# Patient Record
Sex: FEMALE | Race: WHITE | URBAN - METROPOLITAN AREA
[De-identification: names, ages, dates, MRNs, and addresses within clinical notes are randomized per-mention and may not be internally consistent; named-entity substitution may affect disease eponyms.]

---

## 2023-05-02 ENCOUNTER — OFFICE VISIT (OUTPATIENT)
Dept: ORTHOPEDIC SURGERY | Age: 73
End: 2023-05-02

## 2023-05-02 VITALS — TEMPERATURE: 98 F | WEIGHT: 112 LBS | HEIGHT: 58 IN | BODY MASS INDEX: 23.51 KG/M2

## 2023-05-02 DIAGNOSIS — M17.11 PRIMARY OSTEOARTHRITIS OF RIGHT KNEE: Primary | ICD-10-CM

## 2023-05-02 DIAGNOSIS — M19.079 ARTHRITIS OF ANKLE: ICD-10-CM

## 2023-05-02 DIAGNOSIS — M17.12 PRIMARY OSTEOARTHRITIS OF LEFT KNEE: ICD-10-CM

## 2023-05-02 RX ORDER — HYDROXYCHLOROQUINE SULFATE 200 MG/1
200 TABLET, FILM COATED ORAL DAILY
COMMUNITY
Start: 2023-02-16

## 2023-05-02 RX ORDER — ROSUVASTATIN CALCIUM 10 MG/1
TABLET, COATED ORAL
COMMUNITY
Start: 2021-08-02

## 2023-05-10 RX ORDER — TRIAMCINOLONE ACETONIDE 40 MG/ML
40 INJECTION, SUSPENSION INTRA-ARTICULAR; INTRAMUSCULAR ONCE
Status: COMPLETED | OUTPATIENT
Start: 2023-05-10 | End: 2023-05-10

## 2023-05-10 RX ADMIN — TRIAMCINOLONE ACETONIDE 40 MG: 40 INJECTION, SUSPENSION INTRA-ARTICULAR; INTRAMUSCULAR at 15:21

## 2023-05-22 DIAGNOSIS — M19.079 ARTHRITIS OF ANKLE: Primary | ICD-10-CM

## 2023-05-24 DIAGNOSIS — E78.5 DYSLIPIDEMIA: ICD-10-CM

## 2023-05-24 RX ORDER — HYDROXYCHLOROQUINE SULFATE 200 MG/1
200 TABLET ORAL DAILY
COMMUNITY

## 2023-05-24 RX ORDER — ALBUTEROL SULFATE 90 UG/1
1 AEROSOL, METERED RESPIRATORY (INHALATION) 4 TIMES DAILY PRN
COMMUNITY
Start: 2022-12-27 | End: 2023-10-30 | Stop reason: ALTCHOICE

## 2023-05-24 RX ORDER — BACLOFEN 10 MG/1
10 TABLET ORAL NIGHTLY
COMMUNITY
Start: 2022-11-21 | End: 2023-10-30 | Stop reason: ALTCHOICE

## 2023-05-24 RX ORDER — ASTRAGALUS ROOT 470 MG
2 CAPSULE ORAL DAILY
COMMUNITY
Start: 2022-09-16 | End: 2023-10-30 | Stop reason: ALTCHOICE

## 2023-05-24 RX ORDER — MELOXICAM 7.5 MG/1
1 TABLET ORAL DAILY
COMMUNITY
Start: 2022-11-21 | End: 2023-10-30 | Stop reason: ALTCHOICE

## 2023-05-24 RX ORDER — ROSUVASTATIN CALCIUM 10 MG/1
1 TABLET, COATED ORAL DAILY
COMMUNITY
Start: 2021-08-02 | End: 2023-05-24 | Stop reason: SDUPTHER

## 2023-05-24 RX ORDER — DEXTROMETHORPHAN POLISTIREX 30 MG/5 ML
1 SUSPENSION, EXTENDED RELEASE 12 HR ORAL DAILY
COMMUNITY
Start: 2023-01-24 | End: 2023-10-30 | Stop reason: ALTCHOICE

## 2023-05-26 RX ORDER — ROSUVASTATIN CALCIUM 10 MG/1
10 TABLET, COATED ORAL DAILY
Qty: 90 TABLET | Refills: 0 | Status: SHIPPED | OUTPATIENT
Start: 2023-05-26 | End: 2023-09-20 | Stop reason: SDUPTHER

## 2023-08-10 ENCOUNTER — HOSPITAL ENCOUNTER (OUTPATIENT)
Dept: DATA CONVERSION | Facility: HOSPITAL | Age: 73
End: 2023-08-10
Attending: OTOLARYNGOLOGY | Admitting: OTOLARYNGOLOGY
Payer: COMMERCIAL

## 2023-08-10 DIAGNOSIS — J34.3 HYPERTROPHY OF NASAL TURBINATES: ICD-10-CM

## 2023-08-10 DIAGNOSIS — J34.2 DEVIATED NASAL SEPTUM: ICD-10-CM

## 2023-08-10 DIAGNOSIS — J32.9 CHRONIC SINUSITIS, UNSPECIFIED: ICD-10-CM

## 2023-08-10 DIAGNOSIS — J34.9 UNSPECIFIED DISORDER OF NOSE AND NASAL SINUSES: ICD-10-CM

## 2023-08-10 DIAGNOSIS — J34.89 OTHER SPECIFIED DISORDERS OF NOSE AND NASAL SINUSES: ICD-10-CM

## 2023-09-20 DIAGNOSIS — E78.5 DYSLIPIDEMIA: ICD-10-CM

## 2023-09-20 RX ORDER — ROSUVASTATIN CALCIUM 10 MG/1
10 TABLET, COATED ORAL DAILY
Qty: 90 TABLET | Refills: 1 | Status: SHIPPED | OUTPATIENT
Start: 2023-09-20 | End: 2024-03-11

## 2023-09-20 NOTE — TELEPHONE ENCOUNTER
Fax request pt is seeing you on 10-30, last lipid is from 9-7-22, chol. 180, HDL 54.0, LDL 90, triglycerides  180

## 2023-09-29 VITALS
WEIGHT: 110.23 LBS | TEMPERATURE: 97.2 F | BODY MASS INDEX: 23.14 KG/M2 | HEIGHT: 58 IN | SYSTOLIC BLOOD PRESSURE: 155 MMHG | RESPIRATION RATE: 17 BRPM | HEART RATE: 83 BPM | DIASTOLIC BLOOD PRESSURE: 75 MMHG

## 2023-09-30 NOTE — H&P
History of Present Illness:   History Present Illness:  Reason for surgery: Sinus pressure, recurrent sinusitis,  nasal septal deviation, cesar bullosa, hypertrophy of both inferior nasal turbinates   HPI:    Patient is a 72-year-old female here for septoplasty, RFA, limited sinus surgery, reduction of cesar bullosa.  She was last seen in Dr. Bustos's office on 6/2/2023.   His note is as follows:    06.02.2023: She comes for follow up. Still has right maxillary sinus/cheek pressure off and on. Injection likely did not help her. She has some difficulty with breathing  from her right side of nose. CT scan shows deviated nasal septum to right, left cesar bullosa, enlarged inferior turbinates. She gets nose-sinus infections in winter.    Recommendations:  1- septoplasty, reduction of left cesar bullosa, radiofrequency inferior turbinate reduction, limited sinus surgery 77843, 38855, 09447, 29990, 35627    ____________________________________________________________________________________________    05.15.2023: Her CT sinus does not show a sinus infection. Left cesar bullosa, septum deviated to right. Her most tender area is the middle part of her right cheek. I think this could be a nerve pain. 10 mg dexamethasone was injected with local anesthetics.     Plan:  1- local steroid  2- follow up in 2 weeks  ____________________________________________________________________________________________    Ms. Perea is a 71 yo F. She has frequent infections like 4-5 times a year, for many years, could be for 20 years or more. The infections starts as a sinus infection and then may progress into a lung infection, pneumonia.    She has tried fluticasone spray, for about 1-2 years.  She does not think it made any difference at all.    Her main concern is her right cheek pressure.  No environmental allergies.    On examination, TMs look intact  Nasal septum mildly deviated to right superiorly and to left posteriorly  No  visible postnasal discharge  No polyps or purulent discharge on endoscopic exam    Dx:  1- recurrent sinusitis  2- chronic right maxillary sinus pain - pressure    Plan:  1- CT sinus       Today, she is here by herself.  will be here later to take her home. She has had chronic sinus pressure or years, more right-sided than left. There has been no changes in symptoms since she was last seen. Her pulmonologist has put her back on prednisone  for another taper. Otherwise, denies fever, chills, SOB, cough, CP, n/v/d.    Past medical history: ASCVD, dyslipidemia, high-grade dysplasia colonic adenoma, nonrheumatic aortic valve stenosis, osteopenia, groundglass densities seen on recent CT, septic left knee pos for MRSA  Past surgical history: discectomy, D&C, hysterectomy, left knee debridement bronchoscopy   Social history: Never smoker    Allergies:        Allergies:  ·  Demerol : Unknown    Home Medication Review:   Home Medications Reviewed: yes     Impression/Procedure:   ·  Impression and Planned Procedure: Sinus pressure, recurrent sinusitis, no septal deviation, cesar bullosa, hypertrophy of bilateral inferior nasal turbinates  Septoplasty, radiofrequency turbinate reduction, limited sinus surgery, reduction of cesar bullosa       ERAS (Enhanced Recovery After Surgery):  ·  ERAS Patient: no     Review of Systems:   Review of Systems:  Constitutional: NEGATIVE: Fever, Chills, Anorexia,  Weight Loss, Malaise     Eyes: NEGATIVE: Vision Loss/ Change     ENMT: POSITIVE: Nasal Discharge, Nasal Congestion ; NEGATIVE: Ear Pain, Mouth Pain, Throat Pain     Respiratory: NEGATIVE: Dry Cough, Productive Cough,  Wheezing, Shortness of Breath     Cardiac: NEGATIVE: Chest Pain     Gastrointestinal: NEGATIVE: Nausea, Vomiting, Diarrhea     Musculoskeletal: NEGATIVE: Weakness     Neurological: NEGATIVE: Headache     Skin: NEGATIVE: Rash     Hematologic/Lymph: NEGATIVE: Easy Bleeding     All Other Systems: All other systems  reviewed and  are negative       Vital Signs:  Temperature C: 36.2 degrees C   Temperature F: 97.1 degrees F   Heart Rate: 83 beats per minute   Respiratory Rate: 17 breath per minute   Blood Pressure Systolic: 155 mm/Hg   Blood Pressure Diastolic: 75 mm/Hg     Physical Exam by System:    Constitutional: Well developed, A&O x3, in no apparent  distress, alert and cooperative   Eyes: PERRL, EOMI, nonicteric   ENMT: Mucous membranes moist, no apparent injury   Head/Neck: Neck supple, no apparent injury   Respiratory/Thorax: Patent airways, CTAB, normal  breath sounds with good chest expansion, thorax symmetric   Cardiovascular: Regular, normal S 1and S 2, no murmurs,  2+ equal pulses of the extremities   Neurological: CN II-XII grossly intact   Psychological: Appropriate mood and behavior   Skin: Warm and dry, no lesions, no rashes     Consent:   COVID-19 Consent:  ·  COVID-19 Risk Consent Surgeon has reviewed key risks related to the risk of john COVID-19 and if they contract COVID-19 what the risks are.     Attestation:   Note Completion:  I am a:  Advanced Practice Provider   Attending Only - Shared Visit with Advanced Practice Provider This is a shared visit.  I have reviewed the Advanced Practice Provider?s encounter note, approve the Advanced Practice Provider?s documentation,  and provide the following additional information from my personal encounter.    Comments/ Additional Findings    none          Electronic Signatures:  Joan Cole (PAC)  (Signed 10-Aug-2023 09:55)   Authored: History of Present Illness, Allergies, Home  Medication Review, Impression/Procedure, ERAS, Review of Systems, Physical Exam, Consent, Note Completion  Yunior Bustos)  (Signed 10-Aug-2023 09:57)   Authored: Note Completion   Co-Signer: History of Present Illness, Allergies, Home Medication Review, Impression/Procedure, ERAS, Review of Systems, Physical Exam,  Consent, Note Completion      Last Updated: 10-Aug-2023  09:57 by Yunior Bustos)

## 2023-10-01 NOTE — OP NOTE
Post Operative Note:     PreOp Diagnosis: 1- Sinus pressure, 2- recurrent  sinusitis, 3- nose septum deviation, 4- cesar bullosa, 5- hypertrophy of bilateral inferior nasal turbinates   Post-Procedure Diagnosis: 1- Sinus pressure,  2- recurrent sinusitis,   3- nose septum deviation,   4- cesar bullosa,   5- hypertrophy of bilateral inferior nasal turbinates   Procedure: 1.  Septoplasty 98191  2.  Radiofrequency inferior turbinate reduction 10264  3.  Limited sinus surgery 73583, 21906  4.  Reduction of left cesar bullosa 05329   Surgeon: Dr. Bustos   Resident/Fellow/Other Assistant: None   Anesthesia: General   I.V. Fluids: LR   Estimated Blood Loss (mL): 5   Specimen: yes   Complications: None   Findings: Nasal septum deviated to right, left cesar  bullosa, congested inferior turbinates   Patient Returned To/Condition: PACU/stable     Attestation:   Note Completion:  Attending Attestation I performed the procedure without a resident    Comments/ Additional Findings    HISTORY OF PRESENT ILLNESS:  06.02.2023: She comes for follow up. Still has right maxillary sinus/cheek pressure off and on. Injection likely did not help her. She has some difficulty with breathing from her right side of nose. CT scan shows deviated nasal septum to right, left cesar  bullosa, enlarged inferior turbinates. She gets nose-sinus infections in winter.    Recommendations:  1- septoplasty, reduction of left cesar bullosa, radiofrequency inferior turbinate reduction, limited sinus surgery 34954, 50047, 79757, 88380, 40023    _____________________________________________________________________________________    05.15.2023: Her CT sinus does not show a sinus infection. Left cesar bullosa, septum deviated to right. Her most tender area is the middle part of her right cheek. I think this could be a nerve pain. 10 mg dexamethasone was injected with local anesthetics.     Plan:  1- local steroid  2- follow up in 2  weeks  _____________________________________________________________________________________    Ms. Perea is a 71 yo F. She has frequent infections like 4-5 times a year, for many years, could be for 20 years or more. The infections starts as a sinus infection and then may progress into a lung infection, pneumonia.    She has tried fluticasone spray, for about 1-2 years.  She does not think it made any difference at all.    Her main concern is her right cheek pressure.  No environmental allergies.    On examination, TMs look intact  Nasal septum mildly deviated to right superiorly and to left posteriorly  No visible postnasal discharge  No polyps or purulent discharge on endoscopic exam    Dx:  1- recurrent sinusitis  2- chronic right maxillary sinus pain - pressure    Plan:  1-   CT sinus    DESCRIPTION OF THE PROCEDURE:  The patient was brought to the operative suite, placed supine on the operative table.  Time-out was called. Anesthesia was administered by Anesthesia Department.   Please refer to their records for details.  The patient was draped and prepped for the operation.   After topical decongestion, 0 degree endoscope was advanced through patient's nasal cavity. Septum was deviated to right. Septal mucosa was infiltrated with lidocaine, bupivacaine and epinephrine.  A right modified [place] incision was done.  Mucoperichondrial  flaps were raised on both sides.  Karely knife was used to take out a quadrangular  portion of the cartilaginous septum anteriorly. Pituitary forceps, trucut forceps and turbinate scissors were used to remove the deviated portions of cartilaginous  and bony septum. L strut was left to support the dorsum and the tip of the nose.  0-degree endoscope was advanced through the patient's left nasal cavity.  Head and body of the left middle turbinate was injected with lidocaine, ropivacaine and epinephrine. Left middle turbinate was cesar bullosa. A vertical incision using no 15  blade  was done to it, starting from the head. Lateral part of the turbinate was resected using scissors and trucut device. Backbiter forceps was advanced.  Left uncinate process was cut anteriorly using back-biter forceps.  Then, microdebrider with 4 mm blade  was used to remove the left uncinate process. Left maxillary sinus ostium was identified. Left maxillary antrostomy was widened. Left ethmoid bulla was opened. Partial anterior ethmoidectomy was done. Left middle turbinate was lateralized.   0-degree endoscope was advanced through the patient's right nasal cavity.  Head and body of the right middle turbinate was injected with lidocaine, bupivacaine and epinephrine. Right middle turbinate was reduced using radiofrequency. Backbiter forceps  was advanced.  Right uncinate process was cut anteriorly using back-biter forceps.  Then, microdebrider with 4 mm blade was used to remove the right uncinate process. Right maxillary sinus ostium was identified and widened. Right ethmoid bulla was opened.  Partial right anterior ethmoidectomy was done.   Radiofrequency was applied to inferior turbinates anteriorly, posteriorly and at middle parts (max 20 nagy), to shrink the turbinates.   Hemopore was placed to middle meatus and to medial parts of middle turbinates on both sides.  5.0 fast absorbing gut was used to close septoplasty incision. Septum stapler was used to suture the septal mucosal flaps. Merocel with bacitracin were placed into both nasal cavities. Procedure was concluded.    DISPOSITION:  Discharge home.    FOLLOW UP:  In 4 weeks in ENT clinic.    MEDICATIONS:  1- oral antibiotic tab,  2- Tylenol with codeine for pain  3- Nasal rinse  4- mupirocin ointment          Electronic Signatures:  Joan Cole (PAC)  (Signed 10-Aug-2023 10:12)   Authored: Post Operative Note, Note Completion  Yuniro Bustos)  (Signed 10-Aug-2023 12:13)   Authored: Post Operative Note, Note Completion      Last Updated:  10-Aug-2023 12:13 by Yunior Bustos)

## 2023-10-13 PROBLEM — J32.9 RECURRENT SINUSITIS: Status: ACTIVE | Noted: 2023-10-13

## 2023-10-13 PROBLEM — I35.0 NONRHEUMATIC AORTIC VALVE STENOSIS: Status: ACTIVE | Noted: 2023-10-13

## 2023-10-13 PROBLEM — J01.00 ACUTE MAXILLARY SINUSITIS: Status: ACTIVE | Noted: 2023-10-13

## 2023-10-13 PROBLEM — R91.1 LUNG NODULE: Status: ACTIVE | Noted: 2023-10-13

## 2023-10-13 PROBLEM — J20.9 ACUTE BRONCHITIS: Status: ACTIVE | Noted: 2023-10-13

## 2023-10-13 PROBLEM — R93.89 ABNORMAL CXR: Status: ACTIVE | Noted: 2023-10-13

## 2023-10-13 PROBLEM — D51.0 PERNICIOUS ANEMIA: Status: ACTIVE | Noted: 2023-10-13

## 2023-10-13 PROBLEM — J34.89 NASAL CRUSTING: Status: ACTIVE | Noted: 2023-10-13

## 2023-10-13 PROBLEM — J84.9 INTERSTITIAL LUNG DISEASE (MULTI): Status: ACTIVE | Noted: 2023-10-13

## 2023-10-13 PROBLEM — R53.82 CHRONIC FATIGUE: Status: ACTIVE | Noted: 2023-10-13

## 2023-10-13 PROBLEM — D12.6 ADENOMATOUS POLYP OF COLON: Status: ACTIVE | Noted: 2023-10-13

## 2023-10-13 PROBLEM — E78.5 DYSLIPIDEMIA: Status: ACTIVE | Noted: 2023-10-13

## 2023-10-13 PROBLEM — J18.9 PNEUMONIA: Status: ACTIVE | Noted: 2023-10-13

## 2023-10-13 PROBLEM — M54.2 NECK PAIN: Status: ACTIVE | Noted: 2023-10-13

## 2023-10-13 PROBLEM — R19.5 POSITIVE COLORECTAL CANCER SCREENING USING COLOGUARD TEST: Status: ACTIVE | Noted: 2023-10-13

## 2023-10-13 PROBLEM — R03.0 ELEVATED BP WITHOUT DIAGNOSIS OF HYPERTENSION: Status: ACTIVE | Noted: 2023-10-13

## 2023-10-13 PROBLEM — R93.89 ABNORMAL CT SCAN: Status: ACTIVE | Noted: 2023-10-13

## 2023-10-13 PROBLEM — R51.9 FACIAL PAIN: Status: ACTIVE | Noted: 2023-10-13

## 2023-10-13 PROBLEM — K63.5 COLON POLYP: Status: ACTIVE | Noted: 2023-10-13

## 2023-10-13 PROBLEM — M25.551 HIP PAIN, RIGHT: Status: ACTIVE | Noted: 2023-10-13

## 2023-10-13 PROBLEM — N81.10 BLADDER PROLAPSE, FEMALE, ACQUIRED: Status: ACTIVE | Noted: 2023-10-13

## 2023-10-13 PROBLEM — R91.8 GROUND GLASS OPACITY PRESENT ON IMAGING OF LUNG: Status: ACTIVE | Noted: 2023-10-13

## 2023-10-13 PROBLEM — E55.9 VITAMIN D DEFICIENCY: Status: ACTIVE | Noted: 2023-10-13

## 2023-10-13 PROBLEM — M85.89 OSTEOPENIA OF MULTIPLE SITES: Status: ACTIVE | Noted: 2023-10-13

## 2023-10-13 PROBLEM — M54.9 UPPER BACK PAIN: Status: ACTIVE | Noted: 2023-10-13

## 2023-10-13 PROBLEM — J34.2 NOSE SEPTUM DEVIATION: Status: ACTIVE | Noted: 2023-10-13

## 2023-10-13 PROBLEM — J34.3 HYPERTROPHY OF BOTH INFERIOR NASAL TURBINATES: Status: ACTIVE | Noted: 2023-10-13

## 2023-10-13 PROBLEM — J34.89 CONCHA BULLOSA: Status: ACTIVE | Noted: 2023-10-13

## 2023-10-13 PROBLEM — D12.8 TUBULOVILLOUS ADENOMA OF RECTUM: Status: ACTIVE | Noted: 2023-10-13

## 2023-10-13 PROBLEM — R06.02 SOBOE (SHORTNESS OF BREATH ON EXERTION): Status: ACTIVE | Noted: 2023-10-13

## 2023-10-13 PROBLEM — J34.89 OTHER SPECIFIED DISORDERS OF NOSE AND NASAL SINUSES: Status: ACTIVE | Noted: 2023-10-13

## 2023-10-13 PROBLEM — I25.10 ASCVD (ARTERIOSCLEROTIC CARDIOVASCULAR DISEASE): Status: ACTIVE | Noted: 2023-10-13

## 2023-10-13 RX ORDER — BETAMETHASONE DIPROPIONATE 0.5 MG/G
CREAM TOPICAL
COMMUNITY
Start: 2023-05-15 | End: 2023-10-30 | Stop reason: ALTCHOICE

## 2023-10-30 ENCOUNTER — ANCILLARY PROCEDURE (OUTPATIENT)
Dept: RADIOLOGY | Facility: CLINIC | Age: 73
End: 2023-10-30
Payer: MEDICARE

## 2023-10-30 ENCOUNTER — OFFICE VISIT (OUTPATIENT)
Dept: PRIMARY CARE | Facility: CLINIC | Age: 73
End: 2023-10-30
Payer: MEDICARE

## 2023-10-30 ENCOUNTER — LAB (OUTPATIENT)
Dept: LAB | Facility: LAB | Age: 73
End: 2023-10-30
Payer: MEDICARE

## 2023-10-30 VITALS
BODY MASS INDEX: 23.72 KG/M2 | SYSTOLIC BLOOD PRESSURE: 174 MMHG | DIASTOLIC BLOOD PRESSURE: 91 MMHG | OXYGEN SATURATION: 95 % | HEIGHT: 58 IN | HEART RATE: 78 BPM | WEIGHT: 113 LBS

## 2023-10-30 DIAGNOSIS — E55.9 VITAMIN D DEFICIENCY: ICD-10-CM

## 2023-10-30 DIAGNOSIS — Z00.00 ROUTINE GENERAL MEDICAL EXAMINATION AT HEALTH CARE FACILITY: ICD-10-CM

## 2023-10-30 DIAGNOSIS — D51.0 PERNICIOUS ANEMIA: ICD-10-CM

## 2023-10-30 DIAGNOSIS — N81.10 BLADDER PROLAPSE, FEMALE, ACQUIRED: Primary | ICD-10-CM

## 2023-10-30 DIAGNOSIS — Z13.0 SCREENING FOR DEFICIENCY ANEMIA: ICD-10-CM

## 2023-10-30 DIAGNOSIS — E78.5 DYSLIPIDEMIA: ICD-10-CM

## 2023-10-30 DIAGNOSIS — J84.9 INTERSTITIAL LUNG DISEASE (MULTI): ICD-10-CM

## 2023-10-30 DIAGNOSIS — Z13.21 ENCOUNTER FOR VITAMIN DEFICIENCY SCREENING: ICD-10-CM

## 2023-10-30 DIAGNOSIS — Z78.0 MENOPAUSE: ICD-10-CM

## 2023-10-30 DIAGNOSIS — Z78.0 ASYMPTOMATIC MENOPAUSAL STATE: ICD-10-CM

## 2023-10-30 DIAGNOSIS — R79.9 ABNORMAL FINDING OF BLOOD CHEMISTRY, UNSPECIFIED: ICD-10-CM

## 2023-10-30 DIAGNOSIS — Z13.89 SCREENING FOR BLOOD OR PROTEIN IN URINE: ICD-10-CM

## 2023-10-30 DIAGNOSIS — Z00.00 ROUTINE GENERAL MEDICAL EXAMINATION AT HEALTH CARE FACILITY: Primary | ICD-10-CM

## 2023-10-30 DIAGNOSIS — M13.0 POLYARTHRITIS, UNSPECIFIED: ICD-10-CM

## 2023-10-30 DIAGNOSIS — N81.10 BLADDER PROLAPSE, FEMALE, ACQUIRED: ICD-10-CM

## 2023-10-30 DIAGNOSIS — Z13.1 SCREENING FOR DIABETES MELLITUS (DM): ICD-10-CM

## 2023-10-30 DIAGNOSIS — R03.0 ELEVATED BP WITHOUT DIAGNOSIS OF HYPERTENSION: ICD-10-CM

## 2023-10-30 DIAGNOSIS — R53.82 CHRONIC FATIGUE: ICD-10-CM

## 2023-10-30 DIAGNOSIS — I10 HYPERTENSION, UNSPECIFIED TYPE: ICD-10-CM

## 2023-10-30 DIAGNOSIS — Z12.31 ENCOUNTER FOR SCREENING MAMMOGRAM FOR MALIGNANT NEOPLASM OF BREAST: ICD-10-CM

## 2023-10-30 LAB
ALBUMIN SERPL BCP-MCNC: 4.5 G/DL (ref 3.4–5)
ALP SERPL-CCNC: 70 U/L (ref 33–136)
ALT SERPL W P-5'-P-CCNC: 16 U/L (ref 7–45)
ANION GAP SERPL CALC-SCNC: 16 MMOL/L (ref 10–20)
AST SERPL W P-5'-P-CCNC: 21 U/L (ref 9–39)
BASOPHILS # BLD AUTO: 0.03 X10*3/UL (ref 0–0.1)
BASOPHILS NFR BLD AUTO: 0.4 %
BILIRUB SERPL-MCNC: 0.6 MG/DL (ref 0–1.2)
BUN SERPL-MCNC: 8 MG/DL (ref 6–23)
CALCIUM SERPL-MCNC: 9.2 MG/DL (ref 8.6–10.3)
CHLORIDE SERPL-SCNC: 105 MMOL/L (ref 98–107)
CO2 SERPL-SCNC: 25 MMOL/L (ref 21–32)
CREAT SERPL-MCNC: 0.58 MG/DL (ref 0.5–1.05)
EOSINOPHIL # BLD AUTO: 0.27 X10*3/UL (ref 0–0.4)
EOSINOPHIL NFR BLD AUTO: 3.9 %
ERYTHROCYTE [DISTWIDTH] IN BLOOD BY AUTOMATED COUNT: 13.4 % (ref 11.5–14.5)
FERRITIN SERPL-MCNC: 208 NG/ML (ref 8–150)
GFR SERPL CREATININE-BSD FRML MDRD: >90 ML/MIN/1.73M*2
GLUCOSE SERPL-MCNC: 79 MG/DL (ref 74–99)
HCT VFR BLD AUTO: 42.8 % (ref 36–46)
HGB BLD-MCNC: 13.3 G/DL (ref 12–16)
IMM GRANULOCYTES # BLD AUTO: 0.01 X10*3/UL (ref 0–0.5)
IMM GRANULOCYTES NFR BLD AUTO: 0.1 % (ref 0–0.9)
IRON SATN MFR SERPL: 25 % (ref 25–45)
IRON SERPL-MCNC: 89 UG/DL (ref 35–150)
LYMPHOCYTES # BLD AUTO: 1.52 X10*3/UL (ref 0.8–3)
LYMPHOCYTES NFR BLD AUTO: 21.7 %
MAGNESIUM SERPL-MCNC: 2.4 MG/DL (ref 1.6–2.4)
MCH RBC QN AUTO: 28.5 PG (ref 26–34)
MCHC RBC AUTO-ENTMCNC: 31.1 G/DL (ref 32–36)
MCV RBC AUTO: 92 FL (ref 80–100)
MONOCYTES # BLD AUTO: 0.62 X10*3/UL (ref 0.05–0.8)
MONOCYTES NFR BLD AUTO: 8.9 %
NEUTROPHILS # BLD AUTO: 4.54 X10*3/UL (ref 1.6–5.5)
NEUTROPHILS NFR BLD AUTO: 65 %
NRBC BLD-RTO: 0 /100 WBCS (ref 0–0)
PLATELET # BLD AUTO: 286 X10*3/UL (ref 150–450)
PMV BLD AUTO: 9.5 FL (ref 7.5–11.5)
POC APPEARANCE, URINE: CLEAR
POC BILIRUBIN, URINE: NEGATIVE
POC BLOOD, URINE: NEGATIVE
POC COLOR, URINE: YELLOW
POC GLUCOSE, URINE: NEGATIVE MG/DL
POC KETONES, URINE: NEGATIVE MG/DL
POC LEUKOCYTES, URINE: NEGATIVE
POC NITRITE,URINE: NEGATIVE
POC PH, URINE: 5.5 PH
POC PROTEIN, URINE: NEGATIVE MG/DL
POC SPECIFIC GRAVITY, URINE: <=1.005
POC UROBILINOGEN, URINE: 0.2 EU/DL
POTASSIUM SERPL-SCNC: 3.8 MMOL/L (ref 3.5–5.3)
PROT SERPL-MCNC: 7.5 G/DL (ref 6.4–8.2)
RBC # BLD AUTO: 4.66 X10*6/UL (ref 4–5.2)
SODIUM SERPL-SCNC: 142 MMOL/L (ref 136–145)
TIBC SERPL-MCNC: 350 UG/DL (ref 240–445)
UIBC SERPL-MCNC: 261 UG/DL (ref 110–370)
WBC # BLD AUTO: 7 X10*3/UL (ref 4.4–11.3)

## 2023-10-30 PROCEDURE — 1170F FXNL STATUS ASSESSED: CPT

## 2023-10-30 PROCEDURE — 82607 VITAMIN B-12: CPT

## 2023-10-30 PROCEDURE — 1159F MED LIST DOCD IN RCRD: CPT

## 2023-10-30 PROCEDURE — 1160F RVW MEDS BY RX/DR IN RCRD: CPT

## 2023-10-30 PROCEDURE — 83550 IRON BINDING TEST: CPT

## 2023-10-30 PROCEDURE — 1036F TOBACCO NON-USER: CPT

## 2023-10-30 PROCEDURE — 81003 URINALYSIS AUTO W/O SCOPE: CPT

## 2023-10-30 PROCEDURE — 82746 ASSAY OF FOLIC ACID SERUM: CPT

## 2023-10-30 PROCEDURE — 74177 CT ABD & PELVIS W/CONTRAST: CPT | Mod: MF

## 2023-10-30 PROCEDURE — 82306 VITAMIN D 25 HYDROXY: CPT

## 2023-10-30 PROCEDURE — 83036 HEMOGLOBIN GLYCOSYLATED A1C: CPT

## 2023-10-30 PROCEDURE — 74177 CT ABD & PELVIS W/CONTRAST: CPT

## 2023-10-30 PROCEDURE — 2550000001 HC RX 255 CONTRASTS

## 2023-10-30 PROCEDURE — 80053 COMPREHEN METABOLIC PANEL: CPT

## 2023-10-30 PROCEDURE — 36415 COLL VENOUS BLD VENIPUNCTURE: CPT

## 2023-10-30 PROCEDURE — 3080F DIAST BP >= 90 MM HG: CPT

## 2023-10-30 PROCEDURE — 82728 ASSAY OF FERRITIN: CPT

## 2023-10-30 PROCEDURE — 99214 OFFICE O/P EST MOD 30 MIN: CPT

## 2023-10-30 PROCEDURE — 3077F SYST BP >= 140 MM HG: CPT

## 2023-10-30 PROCEDURE — G0438 PPPS, INITIAL VISIT: HCPCS

## 2023-10-30 PROCEDURE — 83735 ASSAY OF MAGNESIUM: CPT

## 2023-10-30 PROCEDURE — 83540 ASSAY OF IRON: CPT

## 2023-10-30 PROCEDURE — 85025 COMPLETE CBC W/AUTO DIFF WBC: CPT

## 2023-10-30 RX ORDER — LEFLUNOMIDE 10 MG/1
TABLET ORAL
COMMUNITY
Start: 2023-10-05

## 2023-10-30 RX ORDER — PREDNISONE 5 MG/1
2.5 TABLET ORAL DAILY
COMMUNITY
Start: 2023-09-19 | End: 2024-03-04 | Stop reason: SDUPTHER

## 2023-10-30 RX ORDER — ESTRADIOL 0.1 MG/G
CREAM VAGINAL
COMMUNITY
Start: 2023-10-27 | End: 2023-12-05 | Stop reason: ALTCHOICE

## 2023-10-30 RX ADMIN — IOHEXOL 75 ML: 350 INJECTION, SOLUTION INTRAVENOUS at 11:05

## 2023-10-30 ASSESSMENT — ENCOUNTER SYMPTOMS
RESPIRATORY NEGATIVE: 1
ARTHRALGIAS: 1
ALLERGIC/IMMUNOLOGIC NEGATIVE: 1
GASTROINTESTINAL NEGATIVE: 1
CONSTITUTIONAL NEGATIVE: 1
CARDIOVASCULAR NEGATIVE: 1
NEUROLOGICAL NEGATIVE: 1
HEMATOLOGIC/LYMPHATIC NEGATIVE: 1
NERVOUS/ANXIOUS: 1

## 2023-10-30 ASSESSMENT — ACTIVITIES OF DAILY LIVING (ADL)
GROCERY_SHOPPING: INDEPENDENT
TAKING_MEDICATION: INDEPENDENT
MANAGING_FINANCES: INDEPENDENT
DOING_HOUSEWORK: INDEPENDENT
DRESSING: INDEPENDENT
BATHING: INDEPENDENT

## 2023-10-30 ASSESSMENT — PATIENT HEALTH QUESTIONNAIRE - PHQ9
1. LITTLE INTEREST OR PLEASURE IN DOING THINGS: NOT AT ALL
2. FEELING DOWN, DEPRESSED OR HOPELESS: NOT AT ALL
SUM OF ALL RESPONSES TO PHQ9 QUESTIONS 1 AND 2: 0

## 2023-10-30 NOTE — PROGRESS NOTES
"Subjective   Reason for Visit: Aileen Perea is an 72 y.o. female here for a Medicare Wellness visit.     Past Medical, Surgical, and Family History reviewed and updated in chart.    Reviewed all medications by prescribing practitioner or clinical pharmacist (such as prescriptions, OTCs, herbal therapies and supplements) and documented in the medical record.    Naz comes here today to establish care with this provider and her initial Medicare wellness exam.  She is a retired RN, has complaints of a bladder prolapse and bladder shifting to the left.  She has seen many providers but feels she needs more evaluation.  She has undergone pelvic therapy, used suppositories (Valium and Lidocaine), pessary was discussed but she does not want to use this kind of device if possible. She complains of left groin pain.        Patient Care Team:  Arie Damon MD as PCP - General  [facility] Dam[facility]     Review of Systems   Constitutional: Negative.    HENT: Negative.     Respiratory: Negative.     Cardiovascular: Negative.    Gastrointestinal: Negative.    Genitourinary:  Positive for pelvic pain.   Musculoskeletal:  Positive for arthralgias.   Skin: Negative.    Allergic/Immunologic: Negative.    Neurological: Negative.    Hematological: Negative.    Psychiatric/Behavioral:  The patient is nervous/anxious.        Objective   Vitals:  BP (!) 173/96 (BP Location: Left arm)   Pulse 78   Ht 1.473 m (4' 10\")   Wt 51.3 kg (113 lb)   SpO2 95%   BMI 23.62 kg/m²       Physical Exam  Constitutional:       Appearance: Normal appearance. She is normal weight.   HENT:      Head: Normocephalic.      Nose: Nose normal.      Mouth/Throat:      Mouth: Mucous membranes are moist.      Pharynx: Oropharynx is clear.   Eyes:      Extraocular Movements: Extraocular movements intact.      Conjunctiva/sclera: Conjunctivae normal.      Pupils: Pupils are equal, round, and reactive to light. "   Cardiovascular:      Rate and Rhythm: Normal rate and regular rhythm.      Pulses: Normal pulses.      Heart sounds: Normal heart sounds.   Pulmonary:      Effort: Pulmonary effort is normal.      Breath sounds: Normal breath sounds.   Abdominal:      General: Abdomen is flat. Bowel sounds are normal.      Palpations: Abdomen is soft.   Musculoskeletal:         General: Normal range of motion.      Cervical back: Normal range of motion and neck supple.   Skin:     General: Skin is warm and dry.      Capillary Refill: Capillary refill takes less than 2 seconds.   Neurological:      General: No focal deficit present.      Mental Status: She is alert. Mental status is at baseline.   Psychiatric:         Mood and Affect: Mood normal.         Behavior: Behavior normal.         Thought Content: Thought content normal.         Judgment: Judgment normal.         Assessment/Plan   Problem List Items Addressed This Visit       Bladder prolapse, female, acquired    Relevant Orders    CT abdomen pelvis w and wo IV contrast    Chronic fatigue    Relevant Orders    Iron and TIBC    CBC and Auto Differential    Comprehensive Metabolic Panel    Magnesium    Folate    Ferritin    Dyslipidemia    Elevated BP without diagnosis of hypertension    Relevant Orders    Iron and TIBC    CBC and Auto Differential    Comprehensive Metabolic Panel    Interstitial lung disease (CMS/HCC)    Pernicious anemia    Relevant Orders    Vitamin B12    Vitamin D deficiency    Relevant Orders    Vitamin D 25-Hydroxy,Total (for eval of Vitamin D levels)     Other Visit Diagnoses       Routine general medical examination at health care facility    -  Primary    Relevant Orders    Iron and TIBC    CBC and Auto Differential    Comprehensive Metabolic Panel    BI mammo bilateral screening tomosynthesis    Vitamin D 25-Hydroxy,Total (for eval of Vitamin D levels)    Vitamin B12    Magnesium    Folate    Ferritin    Hemoglobin A1C    POCT UA Automated  manually resulted (Completed)    Screening for diabetes mellitus (DM)        Relevant Orders    Comprehensive Metabolic Panel    Hemoglobin A1C    Encounter for vitamin deficiency screening        Relevant Orders    Vitamin D 25-Hydroxy,Total (for eval of Vitamin D levels)    Screening for deficiency anemia        Relevant Orders    Iron and TIBC    CBC and Auto Differential    Magnesium    Screening for blood or protein in urine        Relevant Orders    POCT UA Automated manually resulted (Completed)    Menopause        Relevant Orders    XR DEXA bone density    Asymptomatic menopausal state        Relevant Orders    XR DEXA bone density    Hypertension, unspecified type        Relevant Orders    CBC and Auto Differential    Comprehensive Metabolic Panel    Encounter for screening mammogram for malignant neoplasm of breast        Relevant Orders    BI mammo bilateral screening tomosynthesis    Polyarthritis, unspecified        Relevant Orders    Iron and TIBC    Abnormal finding of blood chemistry, unspecified        Relevant Orders    Ferritin    Hemoglobin A1C        It was great to see you today!  Please continue taking your prescribed medications.   I have ordered some labs to be done as soon as you can.  We will call you with the results.  I have ordered a bone density test to be done when you can.  We will call the results.  I have ordered you a mammogram to be done as soon as you are able.  We will call the results.      Please follow up in 3 months

## 2023-10-30 NOTE — PATIENT INSTRUCTIONS
t was great to see you today!  Please continue taking your prescribed medications.   I have ordered some labs to be done as soon as you can.  We will call you with the results.  I have ordered a bone density test to be done when you can.  We will call the results.  I have ordered you a mammogram to be done as soon as you are able.  We will call the results.    Please follow up in 3 months

## 2023-10-31 LAB
25(OH)D3 SERPL-MCNC: 41 NG/ML (ref 30–100)
EST. AVERAGE GLUCOSE BLD GHB EST-MCNC: 108 MG/DL
FOLATE SERPL-MCNC: 23.4 NG/ML
HBA1C MFR BLD: 5.4 %
VIT B12 SERPL-MCNC: 349 PG/ML (ref 211–911)

## 2023-11-01 ENCOUNTER — DOCUMENTATION (OUTPATIENT)
Dept: PRIMARY CARE | Facility: CLINIC | Age: 73
End: 2023-11-01
Payer: COMMERCIAL

## 2023-11-01 DIAGNOSIS — E78.5 DYSLIPIDEMIA: Primary | ICD-10-CM

## 2023-11-01 NOTE — PROGRESS NOTES
Called her to notify her of her CT results.  Encouraged her to continue doing her PT exercising for her pelvic floor, and follow up with GYN.

## 2023-11-08 ENCOUNTER — HOSPITAL ENCOUNTER (OUTPATIENT)
Dept: RADIOLOGY | Facility: HOSPITAL | Age: 73
Discharge: HOME | End: 2023-11-08
Payer: MEDICARE

## 2023-11-08 ENCOUNTER — LAB (OUTPATIENT)
Dept: LAB | Facility: LAB | Age: 73
End: 2023-11-08
Payer: MEDICARE

## 2023-11-08 DIAGNOSIS — Z78.0 ASYMPTOMATIC MENOPAUSAL STATE: ICD-10-CM

## 2023-11-08 DIAGNOSIS — E78.5 DYSLIPIDEMIA: ICD-10-CM

## 2023-11-08 DIAGNOSIS — Z78.0 MENOPAUSE: ICD-10-CM

## 2023-11-08 DIAGNOSIS — Z12.31 ENCOUNTER FOR SCREENING MAMMOGRAM FOR MALIGNANT NEOPLASM OF BREAST: ICD-10-CM

## 2023-11-08 DIAGNOSIS — Z00.00 ROUTINE GENERAL MEDICAL EXAMINATION AT HEALTH CARE FACILITY: ICD-10-CM

## 2023-11-08 LAB
CHOLEST SERPL-MCNC: 154 MG/DL (ref 0–199)
CHOLESTEROL/HDL RATIO: 2.5
HDLC SERPL-MCNC: 60.4 MG/DL
LDLC SERPL CALC-MCNC: 65 MG/DL
NON HDL CHOLESTEROL: 94 MG/DL (ref 0–149)
TRIGL SERPL-MCNC: 145 MG/DL (ref 0–149)
VLDL: 29 MG/DL (ref 0–40)

## 2023-11-08 PROCEDURE — 80061 LIPID PANEL: CPT

## 2023-11-08 PROCEDURE — 77067 SCR MAMMO BI INCL CAD: CPT

## 2023-11-08 PROCEDURE — 36415 COLL VENOUS BLD VENIPUNCTURE: CPT

## 2023-11-08 PROCEDURE — 77085 DXA BONE DENSITY AXL VRT FX: CPT | Performed by: RADIOLOGY

## 2023-11-08 PROCEDURE — 77085 DXA BONE DENSITY AXL VRT FX: CPT

## 2023-11-08 PROCEDURE — 77063 BREAST TOMOSYNTHESIS BI: CPT | Performed by: RADIOLOGY

## 2023-11-08 PROCEDURE — 77067 SCR MAMMO BI INCL CAD: CPT | Performed by: RADIOLOGY

## 2023-11-13 ENCOUNTER — OFFICE VISIT (OUTPATIENT)
Dept: OTOLARYNGOLOGY | Facility: CLINIC | Age: 73
End: 2023-11-13
Payer: MEDICARE

## 2023-11-13 DIAGNOSIS — Z09 POSTOPERATIVE EXAMINATION: Primary | ICD-10-CM

## 2023-11-13 PROCEDURE — 1036F TOBACCO NON-USER: CPT | Performed by: OTOLARYNGOLOGY

## 2023-11-13 PROCEDURE — 99024 POSTOP FOLLOW-UP VISIT: CPT | Performed by: OTOLARYNGOLOGY

## 2023-11-13 PROCEDURE — 1160F RVW MEDS BY RX/DR IN RCRD: CPT | Performed by: OTOLARYNGOLOGY

## 2023-11-13 PROCEDURE — 1159F MED LIST DOCD IN RCRD: CPT | Performed by: OTOLARYNGOLOGY

## 2023-11-13 NOTE — PROGRESS NOTES
"  Chief Complaint     follow up \"Frequent Sinus Infection\"  right cheek pain      Adult Risk ScreeningAdult Risk Screening_[facility]: There are no spiritual/cultural practices/values/needs that are important to know   Initial Fall Risk Screening: Her fall did not result in injury. RYDER does not have a fear of falling. She does not need assistance with sitting, standing or walking. Does not need assistance walking in her home. She does not need assistance in an unfamiliar setting. The patient is not using an assistive device.        Domestic Violence Screen: Does not feel threatened or abused physically, emotionally or sexually. Do you feel UNSAFE?   The patient feels safe in the home.   Depression/Suicide Screening:   During the past 2 weeks, the patient has not felt down, depressed or hopeless.    During the past 2 weeks, the patient has not felt little interest or pleasure in doing things.        History of Present Illness    11.13.2023: She feels good.  No sinus pain.  On examination nasal passages look open bilaterally. Middle meatus look open bilaterally.  There was mild crusting over right middle turbinate anteriorly.  Cleaning was done.    Recommendations:  1-follow-up as needed  2-continue nasal rinse off  _________________________________________________________________    09.11.2023: She had surgery on 08.10.2023     Diagnosis:   1- Sinus pressure,   2- recurrent sinusitis,   3- nose septum deviation,   4- cesar bullosa,   5- hypertrophy of bilateral inferior nasal turbinates      Procedure:   1. Septoplasty 00897   2. Radiofrequency inferior turbinate reduction 57063   3. Limited sinus surgery 27592, 09610   4. Reduction of left cesar bullosa 25084      Findings: Nasal septum deviated to right, left cesar bullosa, congested inferior turbinates     She is doing very good. There was some crusting over inferior turbinates and right middle turbinate. Nasal passages look open. Right cheek pressure went away.   "   Plan:  1- continue nasal rinse and ointment  2- follow up in 2 months     ____________________________________________________________________________________________     06.02.2023: She comes for follow up. Still has right maxillary sinus/cheek pressure off and on. Injection likely did not help her. She has some difficulty with breathing from her right side of nose. CT scan shows deviated nasal septum to right, left cesar bullosa, enlarged inferior turbinates. She gets nose-sinus infections in winter.     Recommendations:  1- septoplasty, reduction of left cesar bullosa, radiofrequency inferior turbinate reduction, limited sinus surgery 75947, 99688, 86001, 81677, 63527     ____________________________________________________________________________________________     05.15.2023: Her CT sinus does not show a sinus infection. Left cesar bullosa, septum deviated to right. Her most tender area is the middle part of her right cheek. I think this could be a nerve pain. 10 mg dexamethasone was injected with local anesthetics.      Plan:  1- local steroid  2- follow up in 2 weeks  ____________________________________________________________________________________________     Ms. Perea is a 71 yo F. She has frequent infections like 4-5 times a year, for many years, could be for 20 years or more. The infections starts as a sinus infection and then may progress into a lung infection, pneumonia.     She has tried fluticasone spray, for about 1-2 years.  She does not think it made any difference at all.     Her main concern is her right cheek pressure.  No environmental allergies.     On examination, TMs look intact  Nasal septum mildly deviated to right superiorly and to left posteriorly  No visible postnasal discharge  No polyps or purulent discharge on endoscopic exam     Dx:  1- recurrent sinusitis  2- chronic right maxillary sinus pain - pressure     Plan:  1- CT sinus      Review of Systems  Please see scanned  review of systems document in the chart.        Active Problems     · Abnormal CT scan (793.99) (R93.89)   · Abnormal CXR (793.2) (R93.89)   · Acute bronchitis (466.0) (J20.9)   · Acute maxillary sinusitis (461.0) (J01.00)   · Adenomatous polyp of colon, unspecified part of colon (211.3) (D12.6)   · ASCVD (arteriosclerotic cardiovascular disease) (429.2,440.9) (I25.10)   · Breast cancer screening (V76.10) (Z12.39)   · Chronic fatigue (780.79) (R53.82)   · Colon cancer screening (V76.51) (Z12.11)   · Colon polyp (211.3) (K63.5)   · Dyslipidemia (272.4) (E78.5)   · Elevated BP without diagnosis of hypertension (796.2) (R03.0)   · Encounter for screening mammogram for malignant neoplasm of breast (V76.12)  (Z12.31)   · Encounter to establish care (V65.8) (Z76.89)   · Facial pain (784.0) (R51.9)   · Ground glass opacity present on imaging of lung (793.19) (R91.8)   · High grade dysplasia in colonic adenoma (211.3) (D12.6)   · Hip pain, right (719.45) (M25.551)   · Interstitial lung disease (515) (J84.9)   · Lung nodule (793.11) (R91.1)   · Neck pain (723.1) (M54.2)   · Nonrheumatic aortic valve stenosis (424.1) (I35.0)   · On prednisone therapy (V58.65) (Z79.52)   · Osteopenia of multiple sites (733.90) (M85.89)   · Pernicious anemia (281.0) (D51.0)   · Pneumonia (486) (J18.9)   · Positive colorectal cancer screening using Cologuard test (787.7) (R19.5)   · Postmenopausal (V49.81) (Z78.0)   · Pre-op testing (V72.84) (Z01.818)   · Recurrent sinusitis (473.9) (J32.9)   · Sinus drainage (478.19) (J34.89)   · SOBOE (shortness of breath on exertion) (786.05) (R06.02)   · Tubulovillous adenoma of rectum (211.4) (D12.8)   · Upper back pain (724.5) (M54.9)   · Vitamin D deficiency (268.9) (E55.9)     Past Medical History     · History of Margot bullosa (478.19) (J34.89)   · Resolved Date: 11 Sep 2023   · History of acute bronchitis (V12.69) (Z87.09)   · Resolved Date: 21 Dec 2022   · History of deviated nasal septum (V12.69)  (Z87.09)   · Resolved Date: 11 Sep 2023   · History of Hypertrophy of both inferior nasal turbinates (478.0) (J34.3)   · Resolved Date: 11 Sep 2023   · History of Sinus pressure (478.19) (J34.89)   · Resolved Date: 11 Sep 2023     Surgical History     · History of Back surgery   · History of Dilation and curettage   · History of Hysterectomy   · History of Knee surgery     Family History     · Family history of Heart problem     · FHx: Parkinson's disease (V17.2) (Z82.0)     Social History     · Non-smoker (V49.89) (Z78.9)     Allergies     · Demerol TABS   Recorded By: Jeanne Mo; 7/19/2021 11:23:46 AM     Current Meds     Medication Name Instruction   Calcium + D 500-1000-40 MG-UNT-MCG Oral Tablet Chewable     Leflunomide 10 MG Oral Tablet     Multi-Day TABS TAKE 1 TABLET DAILY.   Plaquenil 200 MG Oral Tablet     predniSONE 5 MG Oral Tablet Take 1 tablet daily   Rosuvastatin Calcium 10 MG Oral Tablet TAKE 1 TABLET BY MOUTH EVERY DAY      Physical Exam  (old exam note)  General appearance: Healthy-appearing, well-nourished, well groomed, in no acute distress.      Head and Face: Atraumatic with no masses, lesions, or scarring.      Salivary glands: No tenderness of the parotid glands or parotid masses.      No tenderness of the submandibular glands or submandibular masses.      Facial strength: Normal strength and symmetry, no synkinesis or facial tic.      Eyes: Conjunctivas look non-hyperemic bilaterally     Ears: Bilaterally ear canals look normal. Tympanic membranes intact, no hyperemia, fluid or retraction.      Nose: please see andoscopy note     Oral Cavity/Mouth: Lips and tongue look normal.      Throat: No postnasal discharge. No tonsil hypertrophy. No hyperemia.     Neck: Symmetrical, trachea midline.      Pulmonary: Normal respiratory effort.      Lymphatic: No palpable pathologic lymph nodes at neck.      Neurological/Psychiatric: Orientation to person, place, and time: Normal.   Mood and affect:  Normal.      Extremities: No clubbing.        Procedure  NASAL ENDOSCOPY 04.25.2023  0 degree nasal endoscope was advanced through patient's nasal cavities. On examination nasal septum deviated to right superiorly and to left posteriorly. No visible postnasal discharge. No polyps or purulent discharge     Diagnoses/Problems     · Postoperative examination (V67.00) (Z09)   · Nasal crusting (478.19) (J34.89)   · Facial pain (784.0) (R51.9)   · History of deviated nasal septum (V12.69) (Z87.09)   · History of Sinus pressure (478.19) (J34.89)   · History of Cesar bullosa (478.19) (J34.89)   · History of Hypertrophy of both inferior nasal turbinates (478.0) (J34.3)     Assessment and Plan:     11.13.2023: She feels good.  No sinus pain.  On examination nasal passages look open bilaterally. Middle meatus look open bilaterally.  There was mild crusting over right middle turbinate anteriorly.  Cleaning was done.    Recommendations:  1-follow-up as needed  2-continue nasal rinse off  _________________________________________________________________    09.11.2023: She had surgery on 08.10.2023     Diagnosis:   1- Sinus pressure,   2- recurrent sinusitis,   3- nose septum deviation,   4- cesar bullosa,   5- hypertrophy of bilateral inferior nasal turbinates      Procedure:   1. Septoplasty 96050   2. Radiofrequency inferior turbinate reduction 30231   3. Limited sinus surgery 13206, 20951   4. Reduction of left cesar bullosa 30073      Findings: Nasal septum deviated to right, left cesar bullosa, congested inferior turbinates     She is doing very good. There was some crusting over inferior turbinates and right middle turbinate. Nasal passages look open. Right cheek pressure went away.     Plan:  1- continue nasal rinse and ointment  2- follow up in 2 months     ____________________________________________________________________________________________     06.02.2023: She comes for follow up. Still has right maxillary  sinus/cheek pressure off and on. Injection likely did not help her. She has some difficulty with breathing from her right side of nose. CT scan shows deviated nasal septum to right, left cesar bullosa, enlarged inferior turbinates. She gets nose-sinus infections in winter.     Recommendations:  1- septoplasty, reduction of left cesar bullosa, radiofrequency inferior turbinate reduction, limited sinus surgery 25784, 65688, 90432, 69619, 61473     ____________________________________________________________________________________________     05.15.2023: Her CT sinus does not show a sinus infection. Left cesar bullosa, septum deviated to right. Her most tender area is the middle part of her right cheek. I think this could be a nerve pain. 10 mg dexamethasone was injected with local anesthetics.      Plan:  1- local steroid  2- follow up in 2 weeks  ____________________________________________________________________________________________     Ms. Perea is a 73 yo F. She has frequent infections like 4-5 times a year, for many years, could be for 20 years or more. The infections starts as a sinus infection and then may progress into a lung infection, pneumonia.     She has tried fluticasone spray, for about 1-2 years.  She does not think it made any difference at all.     Her main concern is her right cheek pressure.  No environmental allergies.     On examination, TMs look intact  Nasal septum mildly deviated to right superiorly and to left posteriorly  No visible postnasal discharge  No polyps or purulent discharge on endoscopic exam     Dx:  1- recurrent sinusitis  2- chronic right maxillary sinus pain - pressure     Plan:  1- CT sinus

## 2023-11-20 ENCOUNTER — APPOINTMENT (OUTPATIENT)
Dept: OBSTETRICS AND GYNECOLOGY | Facility: CLINIC | Age: 73
End: 2023-11-20
Payer: COMMERCIAL

## 2023-11-30 ENCOUNTER — HOSPITAL ENCOUNTER (OUTPATIENT)
Dept: RESPIRATORY THERAPY | Facility: HOSPITAL | Age: 73
Discharge: HOME | End: 2023-11-30
Payer: MEDICARE

## 2023-11-30 DIAGNOSIS — J84.9 INTERSTITIAL LUNG DISEASE (MULTI): ICD-10-CM

## 2023-11-30 PROCEDURE — 94664 DEMO&/EVAL PT USE INHALER: CPT | Mod: 59

## 2023-11-30 PROCEDURE — 94060 EVALUATION OF WHEEZING: CPT | Performed by: PEDIATRICS

## 2023-11-30 PROCEDURE — 94618 PULMONARY STRESS TESTING: CPT | Performed by: PEDIATRICS

## 2023-11-30 PROCEDURE — 94726 PLETHYSMOGRAPHY LUNG VOLUMES: CPT | Performed by: PEDIATRICS

## 2023-11-30 PROCEDURE — 98960 EDU&TRN PT SELF-MGMT NQHP 1: CPT

## 2023-11-30 PROCEDURE — 94729 DIFFUSING CAPACITY: CPT | Performed by: PEDIATRICS

## 2023-12-04 DIAGNOSIS — M25.552 LEFT HIP PAIN: Primary | ICD-10-CM

## 2023-12-05 ENCOUNTER — OFFICE VISIT (OUTPATIENT)
Dept: ORTHOPEDIC SURGERY | Age: 73
End: 2023-12-05
Payer: MEDICARE

## 2023-12-05 ENCOUNTER — TELEMEDICINE (OUTPATIENT)
Dept: PULMONOLOGY | Facility: CLINIC | Age: 73
End: 2023-12-05
Payer: MEDICARE

## 2023-12-05 VITALS
BODY MASS INDEX: 23.93 KG/M2 | OXYGEN SATURATION: 95 % | HEART RATE: 84 BPM | WEIGHT: 114.5 LBS | SYSTOLIC BLOOD PRESSURE: 135 MMHG | DIASTOLIC BLOOD PRESSURE: 86 MMHG

## 2023-12-05 VITALS — WEIGHT: 111 LBS | BODY MASS INDEX: 23.3 KG/M2 | TEMPERATURE: 98 F | HEIGHT: 58 IN

## 2023-12-05 DIAGNOSIS — S76.212A STRAIN OF ADDUCTOR MAGNUS MUSCLE OF LEFT LOWER EXTREMITY, INITIAL ENCOUNTER: ICD-10-CM

## 2023-12-05 DIAGNOSIS — J84.9 ILD (INTERSTITIAL LUNG DISEASE) (MULTI): Primary | ICD-10-CM

## 2023-12-05 DIAGNOSIS — S76.212A INGUINAL STRAIN, LEFT, INITIAL ENCOUNTER: Primary | ICD-10-CM

## 2023-12-05 DIAGNOSIS — S76.212A STRAIN OF ADDUCTOR MAGNUS MUSCLE OF LEFT LOWER EXTREMITY, INITIAL ENCOUNTER: Primary | ICD-10-CM

## 2023-12-05 DIAGNOSIS — Z79.52 ON PREDNISONE THERAPY: ICD-10-CM

## 2023-12-05 LAB
MGC ASCENT PFT - FEV1 - POST: 1.09
MGC ASCENT PFT - FEV1 - PRE: 1.06
MGC ASCENT PFT - FEV1 - PREDICTED: 1.65
MGC ASCENT PFT - FVC - POST: 1.23
MGC ASCENT PFT - FVC - PRE: 1.3
MGC ASCENT PFT - FVC - PREDICTED: 2.08

## 2023-12-05 PROCEDURE — 99213 OFFICE O/P EST LOW 20 MIN: CPT | Performed by: ORTHOPAEDIC SURGERY

## 2023-12-05 PROCEDURE — G8427 DOCREV CUR MEDS BY ELIG CLIN: HCPCS | Performed by: ORTHOPAEDIC SURGERY

## 2023-12-05 PROCEDURE — 3017F COLORECTAL CA SCREEN DOC REV: CPT | Performed by: ORTHOPAEDIC SURGERY

## 2023-12-05 PROCEDURE — 1036F TOBACCO NON-USER: CPT | Performed by: ORTHOPAEDIC SURGERY

## 2023-12-05 PROCEDURE — G8420 CALC BMI NORM PARAMETERS: HCPCS | Performed by: ORTHOPAEDIC SURGERY

## 2023-12-05 PROCEDURE — G8399 PT W/DXA RESULTS DOCUMENT: HCPCS | Performed by: ORTHOPAEDIC SURGERY

## 2023-12-05 PROCEDURE — 1090F PRES/ABSN URINE INCON ASSESS: CPT | Performed by: ORTHOPAEDIC SURGERY

## 2023-12-05 PROCEDURE — G8484 FLU IMMUNIZE NO ADMIN: HCPCS | Performed by: ORTHOPAEDIC SURGERY

## 2023-12-05 PROCEDURE — 1123F ACP DISCUSS/DSCN MKR DOCD: CPT | Performed by: ORTHOPAEDIC SURGERY

## 2023-12-05 PROCEDURE — 99214 OFFICE O/P EST MOD 30 MIN: CPT | Performed by: INTERNAL MEDICINE

## 2023-12-05 RX ORDER — LEFLUNOMIDE 10 MG/1
TABLET ORAL
COMMUNITY
Start: 2023-10-05

## 2023-12-05 RX ORDER — PREDNISONE 5 MG/1
2.5 TABLET ORAL DAILY
COMMUNITY
Start: 2023-09-19

## 2023-12-05 NOTE — PATIENT INSTRUCTIONS
Mrs Perea, it was a pleasure seeing you in clinic today. We discussed the following:     -You will continue to follow-up appointment with rheumatology  -It is ok to take prednisone with the plaquenil  -You can decrease your prednisone to 2.5 mg daily     Will see you back in clinic in 3-4 months with repeat breathing test and walking test

## 2023-12-05 NOTE — PROGRESS NOTES
Chief Complaint   Patient presents with    Hip Pain     Est patient new problem left hip pain. Patient states she pulled her groin about 2 months ago getting out of bed. She states continued problems since. Hanna Stevenson  Is a 67 y.o.  female who presents today complaining of left hip pain. She states that the pain began 2  month(s) ago. She did have a history of injury. Patients states pain is located anterior, groin and has tenderness over the  Anterior portion of the hip. Hip pain is described as aching and throbbing and  is worse with weight bearing and is aggravated by walking along with groin discomfort. She states the pain occurs in the  no apparent pattern. Patient states hip pain is relieved by rest. Patient does have a history of back pain. The patient does not use ambulatory aid. 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       Past Medical History:   Diagnosis Date    MRSA infection      Past Surgical History:   Procedure Laterality Date    BACK SURGERY      BONE INCISION AND DRAINAGE  4/19/2011    left knee    DILATION AND CURETTAGE OF UTERUS      HYSTERECTOMY (CERVIX STATUS UNKNOWN)         Current Outpatient Medications:     leflunomide (ARAVA) 10 MG tablet, , Disp: , Rfl:     predniSONE (DELTASONE) 5 MG tablet, Take 0.5 tablets by mouth daily, Disp: , Rfl:     hydroxychloroquine (PLAQUENIL) 200 MG tablet, Take 1 tablet by mouth daily, Disp: , Rfl:     rosuvastatin (CRESTOR) 10 MG tablet, Take by mouth, Disp: , Rfl:     Multiple Vitamin (THERA) TABS, Take  by mouth.  , Disp: , Rfl:   Allergies   Allergen Reactions    Demerol      Social History     Socioeconomic History    Marital status:      Spouse name: Not on file    Number of children: Not on file    Years of education: Not on file    Highest education level: Not on file   Occupational History    Not on file   Tobacco Use    Smoking status: Never    Smokeless tobacco: Never   Substance and Sexual Activity    Alcohol

## 2023-12-05 NOTE — PROGRESS NOTES
History of Present Illness  Mrs. Perea is a 72 y.o.  woman never a smoker follows up in pulmonary clinic for ILD, most likely organizing pneumonia     PCP: Dr. Damon     HPI:  New patient here today to establish care. Patient had a CT cardiac scoring which did show bilateral groundglass densities. She also had a chest x-ray which showed the same. Patient was not feeling well at the time she was treated with antibiotics and prednisone and tells me she greatly improved. She still has residual shortness of breath and coughing. This is new patient has always been very healthy. She states she has been short of breath since her sinus infection she had last December. She was tested for Covid in December it was negative has not been tested since although her chest CT does look like it could have been Covid. She still has some rhonchi in her bilateral lower lobes.i am going to order her another round of antibiotics and prednisone and repeat a chest CT and also order her a PFT.     11/17/2021: Ms Perea feels better after prednisone and azithromycin. She is able to walk on flat surfaces without issue but walking up hill is still a struggle for her. She had a repeat CT done which shows mosaic attenuation which is unchanged. She also had a PFT done which shows moderately severe restriction with severe reduction in diffusing capacity.     12/16/2021: Ms Perea is doing ok breathing is fine. I reviewed the rheumatologic labs with her (all are normal)     02/15/2022: Since the last visit, patient's breathing has been improving. No new chest pain, cough, sputum, fever, chills or night sweats. She is walking 26502 steps daily without limitations.     05/10/2022: Since the last visit, patient's breathing has been worsening. Continues to cough and has reduced exercise capacity. Had bronchoscopy with biopsy done (results below).      06/14/2022 since her last visit she feels that her breathing is greatly improved. She has  been taking 40 mg of prednisone with great improvement in her symptoms. She continues to have an occasional dry cough. No significant side effects of prednisone outside of some mood changes and increased energy. She had repeat high-resolution CT scan done as well as a breathing test and a 6-minute walk test (results below).     08/30/2022: Since the last visit, patient's breathing has been improving. CAT 3. Patient had repeat breathing test, done (results below). She is down to 20mg of prednisone. She gained about 4-5 pounds.      11/1/2022: Complains mostly of neck pain. Since the last visit, patient's breathing is mostly unchanged. Stopped the prednisone on 10/07/2022. No new chest pain, cough, sputum, fever, chills or night sweats. Patient had repeat breathing test, 6MWT, CT scan done (results below).      01/10/2023: Going to PT for shoulder and back pain. Feels her breathing is mostly unchagned. Coughs occasionally, uses her nasal sprays. No new chest pain, cough, sputum, fever, chills or night sweats. She was in the emergency department on 11/02/2022, a CTPE was done and was negative for PE. Was seen again in the ED on 12/02, CXR looked slightly worse, she completed a course of levaquin and Medrol pack. Partially improved today.     02/07/2023: Since the last visit, patient's breathing is mostly unchanged. No new chest pain, cough, sputum, fever, chills or night sweats. Complains mostly of joint pain. Had repeat CT done (results below).      03/07/2023: Since the last visit, patient's breathing has been improving. She was saw a rheumatologist and had ?lab work, and hw was started on Plaquenil. 02/16 she started the Plaquenil. Since the last visit, patient's breathing has been improving. CAT of 2.      06/06/2023: Since the last visit, patient's breathing is mostly unchanged. No new chest pain, cough, sputum, fever, chills or night sweats. Rheumatology increased her palquenil to 400mg. Had a fall and hurt her  left knee. Patient had repeat breathing test, 6MWT, CT scan done (results below).      08/29/2023: She is now up to 400mg for plaquenil and rheum added leflunomide. Since the last visit, patient's breathing is mostly unchanged. No new chest pain, cough, sputum, fever, chills or night sweats. Patient had repeat CT scan done (results below). she is still taking 5 mg of prednisone    12/5/2023: Since the last visit, patient's breathing is better. No hospital admissions or ED visits. No new chest pain, cough, sputum, fever, chills or night sweats. Patient active in everyday life goes on walks, climbs stairs, does participates in regimented exercise. Had sinus surgery in August which she thinks helped. Patient had repeat breathing test, 6MWT, CT scan done (results below). Patient compliant with prescribed inhalers using daily plaquenil 200mg once daily and leflulonimde 10mg daily and prednisone 2.5mg.     Previous pulmonary history:   She has no history of recurrent infections, or lung disease as a child. She had no previous lung hx, never on oxygen or inhaler therapy. She currently is on no supplemental oxygen.      Inhalers/nebulized medications: none     Hospitalization History:  She has not been hospitalized over the last year for breathing related problem.     Sleep history:  Denies snoring, apneas, feeling tired during the day or taking naps during the day. Does not feel tired during the day or take frequent naps.  STOP-BANG score of 2     Comorbidities:   ASCVD  anemia     SH:  smoking: never but second hand smoke from  of 50 years  drinking: none  illicit drug use: none     Occupation: (Full questionnaire on exposures obtained, discussed with the patient and scanned to EMR)  worked as RN  No known exposure to asbestos, silica or beryllium     Family History:  No family history of lung diseases or cancer     Imaging history: (I have personally reviewed the imaging below)  07/24/2023 HRCT with great  improvement in inflammation and minimal residual   01/12/2023 HRCT with slight progression of GGO  10/24/2022 HRCT with great improvement but residual GGO in the mediastinal area with some progression of traction bronchiectasis  06/08/2022 (compared to 04/2022) HRCT with great improvement in GGO opacities with some residual  8/11/2021 space a CT cardiac scoring shows a groundglass density bilaterally     PFTs:  05/30/2023 -> Ratio of 0.86/FEV1 1.23L (74%)(no BD response)/FVC 1.43L (64%)/TLC 54%/RVtoTLC ratio 0.36/DLCO 32%  10/24/2022 -> Ratio of 0.89/FEV1 1.25L (74%)(no BD response)/FVC 1.4L (62%)/DLCO 32 -> 52%  08/23/2022: -> Ratio of 0.9/FEV1 1.37L (81%) (no BD response)/FVC 1.53L (68%)/DLCO 37->60%  06/08/2022 -> FEV1/FVC ratio 0.89/FEV1 1.31L (77%) (no BD response)/FVC 1.48L (65%)     6 MWTs:  05/30/2023 ->on RA, 385m. Peak SpO2 of 100%. Reginald SpO2 90%.   10/24/2022 ->on RA, 448m. Peak SpO2 of 99%. Reginald SpO2 93%.   06/08/2022 ->on RA, 455m. Peak SpO2 of 100%. Reginald SpO2 93%.      Lung biopsy:   04/20/2022: BAL done but no differential or CD4/CD8 ratio reported. Lymph node biopsy with non-caseating granulomas. TBBx with organizing PNA.      Echo:   8/2021-> Normal EF 50-55%, mild aortic valve stenosis, moderate aortic valve cusp calcification, mild aortic valve regurgitation     Labs:  : Hb 15.3 , Eos <250, Bicarb 26, Creat 053        Review of Systems:   Pertinent positives and review of systems as noted above.  Remaining 10 review of systems is negative or noncontributory to today's episode of care.     Past Medical History:   Diagnosis Date    Personal history of other diseases of the respiratory system 12/21/2022    History of acute bronchitis        Medication Documentation Review Audit       Reviewed by Magali Blancas MA (Medical Assistant) on 12/05/23 at 0855      Medication Order Taking? Sig Documenting Provider Last Dose Status   Discontinued 12/05/23 0855   leflunomide (Arava) 10 mg tablet [id number] No   Historical Provider, MD Taking Active   PlaqueniL 200 mg tablet [id number] No Take 1 tablet (200 mg) by mouth once daily. Historical Provider, MD Taking Active   predniSONE (Deltasone) 5 mg tablet [id number] No Take 0.5 tablets (2.5 mg) by mouth once daily. Historical Provider, MD Taking Active   rosuvastatin (Crestor) 10 mg tablet [id number] No Take 1 tablet (10 mg) by mouth once daily. Mariya Salazar, ALEXANDRO-CNP Taking Active                 Physical Exam  Constitutional: Alert and oriented. In no acute distress. Well developed, well nourished.  Head and Face: Normal.   Oropharynx: normal.  Neck: No neck mass observed.  Pulmonary: Chest is normal to inspection. No increased work of breathing or signs of respiratory distress.   CV: No obvious peripheral edema.  MSK: normal gait and station. No clubbing or cyanosis of the fingernails.  Skin: Normal skin color and pigmentation, normal skin turgor, and no rash.  Neurologic: EOMI. Moving all 4 extremities.  Psychiatric: Intact judgement and insight.     Provider Impressions     # Ground glass densities on CT consistent with ILD PFT restrictive  - Rheumatologic labs normal HP panel normal  04/20/2022: BAL done without infection. Lymph node biopsy with non-caseating granulomas. TBBx with organizing PNA.   -tapered off prednisone on October 7th 2022  (started on 05/14/2022 at 40mg)  She was in the emergency department on 11/02/2022, a CTPE was done and was negative for PE. Was seen again in the ED on 12/02, CXR looked slightly worse, she completed a course of levaquin and Medrol pack. Partially improved today   -Given CT progression will restart prednisone (02/07/2023) at 20mg and taper by 5mg every 2 weeks.  -will stay on 5mg until next CT     Discussed at ILD multidisciplinary meeting 5/12/22   - MDD Consensus Diagnosis: Unclear - concern for alveolar sarcoid vs lymphoproliferative process.    - Recommendation and Management Plan: Recommend biopsy vs treatment.      #  Chronic inflammatory joint pain:  -serologies done and negative in Nov 2021  -Saw rheumatology. Labs Pending. Started on Plaquenil     # Chronic sinusitis and deviated septum:  -Had surgery in August with Dr. Bustos from ENT       f/u in 2-3 months

## 2023-12-05 NOTE — ADDENDUM NOTE
Encounter addended by: Ariadne Paz, RRT on: 12/5/2023 9:16 AM   Actions taken: Charge Capture section accepted

## 2023-12-08 ENCOUNTER — APPOINTMENT (OUTPATIENT)
Dept: OBSTETRICS AND GYNECOLOGY | Facility: CLINIC | Age: 73
End: 2023-12-08
Payer: MEDICARE

## 2024-01-15 ENCOUNTER — APPOINTMENT (OUTPATIENT)
Dept: RESPIRATORY THERAPY | Facility: HOSPITAL | Age: 74
End: 2024-01-15
Payer: MEDICARE

## 2024-01-23 DIAGNOSIS — J84.9 INTERSTITIAL LUNG DISEASE (MULTI): Primary | ICD-10-CM

## 2024-01-25 PROBLEM — M62.89 PELVIC FLOOR DYSFUNCTION: Status: ACTIVE | Noted: 2024-01-25

## 2024-01-25 PROBLEM — N95.2 POSTMENOPAUSAL ATROPHIC VAGINITIS: Status: ACTIVE | Noted: 2024-01-25

## 2024-01-29 ENCOUNTER — TELEMEDICINE (OUTPATIENT)
Dept: PRIMARY CARE | Facility: CLINIC | Age: 74
End: 2024-01-29
Payer: MEDICARE

## 2024-01-29 DIAGNOSIS — R53.82 CHRONIC FATIGUE: ICD-10-CM

## 2024-01-29 DIAGNOSIS — N81.10 BLADDER PROLAPSE, FEMALE, ACQUIRED: Primary | ICD-10-CM

## 2024-01-29 DIAGNOSIS — M62.89 PELVIC FLOOR DYSFUNCTION: ICD-10-CM

## 2024-01-29 PROCEDURE — 99213 OFFICE O/P EST LOW 20 MIN: CPT

## 2024-01-29 ASSESSMENT — COLUMBIA-SUICIDE SEVERITY RATING SCALE - C-SSRS
2. HAVE YOU ACTUALLY HAD ANY THOUGHTS OF KILLING YOURSELF?: NO
1. IN THE PAST MONTH, HAVE YOU WISHED YOU WERE DEAD OR WISHED YOU COULD GO TO SLEEP AND NOT WAKE UP?: NO
6. HAVE YOU EVER DONE ANYTHING, STARTED TO DO ANYTHING, OR PREPARED TO DO ANYTHING TO END YOUR LIFE?: NO

## 2024-01-29 ASSESSMENT — PATIENT HEALTH QUESTIONNAIRE - PHQ9
SUM OF ALL RESPONSES TO PHQ9 QUESTIONS 1 AND 2: 0
1. LITTLE INTEREST OR PLEASURE IN DOING THINGS: NOT AT ALL
2. FEELING DOWN, DEPRESSED OR HOPELESS: NOT AT ALL

## 2024-01-29 ASSESSMENT — ENCOUNTER SYMPTOMS
ARTHRALGIAS: 0
DYSURIA: 0
PALPITATIONS: 0
SINUS PRESSURE: 0
WEAKNESS: 0
SHORTNESS OF BREATH: 0
FATIGUE: 0
DIZZINESS: 0
LIGHT-HEADEDNESS: 0
FEVER: 0
COUGH: 0
CONSTITUTIONAL NEGATIVE: 1

## 2024-01-29 NOTE — PROGRESS NOTES
"Subjective   Patient ID: Aileen Perea \"Naz Bronson\" is a 73 y.o. female who presents for No chief complaint on file..  Today she is accompanied by alone.     Aileen is being seen for follow-up virtually today.  She reports that she has been feeling much better.  She went to an orthopedic provider who recommended physical therapy for her hip pain.  She states that they told her that something was online and they corrected it and she has been feeling much better since.  She is following with urology for her prolapsed bladder..  She has no new complaints today        Review of Systems   Constitutional: Negative.  Negative for fatigue and fever.   HENT:  Negative for congestion and sinus pressure.    Respiratory:  Negative for cough and shortness of breath.    Cardiovascular:  Negative for chest pain, palpitations and leg swelling.   Genitourinary:  Negative for dysuria and urgency.   Musculoskeletal:  Negative for arthralgias.   Neurological:  Negative for dizziness, weakness and light-headedness.       Objective   There were no vitals taken for this visit.  BSA: There is no height or weight on file to calculate BSA.  Growth percentiles: Facility age limit for growth %macario is 20 years. Facility age limit for growth %macario is 20 years.     Physical Exam  There were no vitals taken for this visit.   Lab Results   Component Value Date    GLUCOSE 79 10/30/2023    CALCIUM 9.2 10/30/2023     10/30/2023    K 3.8 10/30/2023    CO2 25 10/30/2023     10/30/2023    BUN 8 10/30/2023    CREATININE 0.58 10/30/2023      CONSTITUTIONAL: Well developed, alert and oriented x3, no distress, alert and cooperative  EYES: EOMI intact  ENMT: Mucous membranes appear moist  HEART+LUNGS, ABD: unable to obtain d/t virtual visit today.   NEUROLOGIC: Alert & Oriented x3, able to follow simple commands      Assessment/Plan   Problem List Items Addressed This Visit       Bladder prolapse, female, acquired - Primary       Following with " urology       stable    Chronic fatigue       Stable           Pelvic floor dysfunction        Stable, improving         Completed PT     It was great to see you today!  Please continue taking your prescribed medications.   Refill have been sent to your pharmacy.    Please follow up in 3 months

## 2024-01-29 NOTE — PATIENT INSTRUCTIONS
It was great to see you today!  Please continue taking your prescribed medications.   Refill have been sent to your pharmacy.    Please follow up in 3 months

## 2024-03-04 DIAGNOSIS — J84.9 ILD (INTERSTITIAL LUNG DISEASE) (MULTI): Primary | ICD-10-CM

## 2024-03-04 RX ORDER — PREDNISONE 5 MG/1
2.5 TABLET ORAL DAILY
Qty: 30 TABLET | Refills: 5 | Status: SHIPPED | OUTPATIENT
Start: 2024-03-04

## 2024-03-11 DIAGNOSIS — E78.5 DYSLIPIDEMIA: ICD-10-CM

## 2024-03-11 RX ORDER — ROSUVASTATIN CALCIUM 10 MG/1
10 TABLET, COATED ORAL DAILY
Qty: 90 TABLET | Refills: 1 | Status: SHIPPED | OUTPATIENT
Start: 2024-03-11

## 2024-03-21 ENCOUNTER — TELEPHONE (OUTPATIENT)
Dept: PRIMARY CARE | Facility: CLINIC | Age: 74
End: 2024-03-21
Payer: MEDICARE

## 2024-03-21 ENCOUNTER — HOSPITAL ENCOUNTER (OUTPATIENT)
Dept: RADIOLOGY | Facility: CLINIC | Age: 74
Discharge: HOME | End: 2024-03-21
Payer: MEDICARE

## 2024-03-21 DIAGNOSIS — M25.462 PAIN AND SWELLING OF LEFT KNEE: ICD-10-CM

## 2024-03-21 DIAGNOSIS — M25.562 PAIN AND SWELLING OF LEFT KNEE: ICD-10-CM

## 2024-03-21 DIAGNOSIS — M25.462 SWELLING OF JOINT OF LEFT KNEE: Primary | ICD-10-CM

## 2024-03-21 PROCEDURE — 73562 X-RAY EXAM OF KNEE 3: CPT | Mod: LT

## 2024-03-21 PROCEDURE — 73562 X-RAY EXAM OF KNEE 3: CPT | Mod: LEFT SIDE | Performed by: RADIOLOGY

## 2024-03-21 NOTE — TELEPHONE ENCOUNTER
Patient called because her left knee is swollen and painful since Tuesday. She has hx of Septic knee in 2011. She wants a referral to PT, she called her PT she normally goes to and he told her she would need a referral. I told her you may want to see her or order some xrays first so she is aware.

## 2024-03-29 ENCOUNTER — HOSPITAL ENCOUNTER (EMERGENCY)
Age: 74
Discharge: HOME | End: 2024-03-29
Payer: MEDICARE

## 2024-03-29 ENCOUNTER — TELEPHONE (OUTPATIENT)
Dept: ORTHOPEDIC SURGERY | Age: 74
End: 2024-03-29

## 2024-03-29 ENCOUNTER — APPOINTMENT (OUTPATIENT)
Dept: GENERAL RADIOLOGY | Age: 74
End: 2024-03-29
Payer: MEDICARE

## 2024-03-29 VITALS
OXYGEN SATURATION: 98 % | RESPIRATION RATE: 18 BRPM | BODY MASS INDEX: 23 KG/M2 | WEIGHT: 110 LBS | HEART RATE: 91 BPM | TEMPERATURE: 98.2 F | SYSTOLIC BLOOD PRESSURE: 164 MMHG | DIASTOLIC BLOOD PRESSURE: 96 MMHG

## 2024-03-29 DIAGNOSIS — M79.604 PAIN OF RIGHT LOWER EXTREMITY: ICD-10-CM

## 2024-03-29 DIAGNOSIS — M25.562 CHRONIC PAIN OF LEFT KNEE: Primary | ICD-10-CM

## 2024-03-29 DIAGNOSIS — G89.29 CHRONIC PAIN OF LEFT KNEE: Primary | ICD-10-CM

## 2024-03-29 LAB
BASOPHILS # BLD: 0.03 K/UL (ref 0–0.2)
BASOPHILS NFR BLD: 0 % (ref 0–2)
EOSINOPHIL # BLD: 0.14 K/UL (ref 0.05–0.5)
EOSINOPHILS RELATIVE PERCENT: 2 % (ref 0–6)
ERYTHROCYTE [DISTWIDTH] IN BLOOD BY AUTOMATED COUNT: 13.2 % (ref 11.5–15)
ERYTHROCYTE [SEDIMENTATION RATE] IN BLOOD BY WESTERGREN METHOD: 26 MM/HR (ref 0–20)
HCT VFR BLD AUTO: 40.1 % (ref 34–48)
HGB BLD-MCNC: 12.9 G/DL (ref 11.5–15.5)
IMM GRANULOCYTES # BLD AUTO: <0.03 K/UL (ref 0–0.58)
IMM GRANULOCYTES NFR BLD: 0 % (ref 0–5)
LYMPHOCYTES NFR BLD: 0.98 K/UL (ref 1.5–4)
LYMPHOCYTES RELATIVE PERCENT: 13 % (ref 20–42)
MCH RBC QN AUTO: 29.3 PG (ref 26–35)
MCHC RBC AUTO-ENTMCNC: 32.2 G/DL (ref 32–34.5)
MCV RBC AUTO: 90.9 FL (ref 80–99.9)
MONOCYTES NFR BLD: 0.79 K/UL (ref 0.1–0.95)
MONOCYTES NFR BLD: 10 % (ref 2–12)
NEUTROPHILS NFR BLD: 75 % (ref 43–80)
NEUTS SEG NFR BLD: 5.84 K/UL (ref 1.8–7.3)
PLATELET # BLD AUTO: 247 K/UL (ref 130–450)
PMV BLD AUTO: 9.4 FL (ref 7–12)
RBC # BLD AUTO: 4.41 M/UL (ref 3.5–5.5)
WBC OTHER # BLD: 7.8 K/UL (ref 4.5–11.5)

## 2024-03-29 PROCEDURE — 99211 OFF/OP EST MAY X REQ PHY/QHP: CPT

## 2024-03-29 PROCEDURE — 73590 X-RAY EXAM OF LOWER LEG: CPT

## 2024-03-29 PROCEDURE — 85652 RBC SED RATE AUTOMATED: CPT

## 2024-03-29 PROCEDURE — 85025 COMPLETE CBC W/AUTO DIFF WBC: CPT

## 2024-03-29 PROCEDURE — 36415 COLL VENOUS BLD VENIPUNCTURE: CPT

## 2024-03-29 ASSESSMENT — PAIN - FUNCTIONAL ASSESSMENT: PAIN_FUNCTIONAL_ASSESSMENT: NONE - DENIES PAIN

## 2024-03-29 NOTE — ED PROVIDER NOTES
Department of Emergency Medicine  [facility]  Provider Note  Admit Date/Time: 3/29/2024 11:03 AM  Room:   NAME: Sharita Askew  : 1950  MRN: [id number]     Chief Complaint:  Knee Pain (Left knee pain swelling, (right shin swelling, shin splints) for over a week, was on medrol dose pack and abx, Dr Fagan follows, went to  in [place])    History of Present Illness        Sharita Askew is a 73 y.o. female who has a past medical history of:   Past Medical History:   Diagnosis Date    MRSA infection     presents to the urgent care center by private car for evaluation.  She is having pain in her left knee this has been an ongoing problem it swells at times.  She was already seen at  another urgent care about 2 weeks ago and had an x-ray was put on a Medrol Dosepak and something for inflammation and she said it is the swelling is down but she still having a lot of pain.  She was also put on an antibiotic by somebody because she said \"I have a history of a septic joint \".  she sees Dr. Fagan for her knee pain,  Dr. Fagan office told her earlier in the week if if the medication did not help that she should come in to have it drained on Friday and for \"some blood work \"she called Dr. Fagan office today and now they are closed because it is a holiday . So  told her to come here so she is here for this knee pain and also having pain in front of the right lower extremity she feels that is due to to excessive walking that she did.  She has been taking ibuprofen without relief of any of her leg pain.  She said Dr. Fagan office wants her to have \"blood work including a sed rate.  ROS    Pertinent positives and negatives are stated within HPI, all other systems reviewed and are negative.    Past Surgical History:   Procedure Laterality Date    BACK SURGERY      BONE INCISION AND DRAINAGE  2011    left knee    DILATION AND CURETTAGE OF UTERUS      HYSTERECTOMY (CERVIX STATUS

## 2024-03-29 NOTE — TELEPHONE ENCOUNTER
Pt was recently seen in Urgent Care for Chronic pain of left knee  and Pain of right lower extremity.  She called to schedule her follow up.  She did not want to wait to until his next available, and she also did not want to see Crys.  She said that Dr Fagan told her to call and speak with you about getting her in soon.

## 2024-04-01 ENCOUNTER — APPOINTMENT (OUTPATIENT)
Dept: RESPIRATORY THERAPY | Facility: HOSPITAL | Age: 74
End: 2024-04-01
Payer: MEDICARE

## 2024-04-01 ENCOUNTER — OFFICE VISIT (OUTPATIENT)
Dept: ORTHOPEDIC SURGERY | Age: 74
End: 2024-04-01
Payer: MEDICARE

## 2024-04-01 VITALS — BODY MASS INDEX: 23.09 KG/M2 | HEIGHT: 58 IN | TEMPERATURE: 98 F | WEIGHT: 110 LBS

## 2024-04-01 DIAGNOSIS — M17.11 PRIMARY OSTEOARTHRITIS OF RIGHT KNEE: Primary | ICD-10-CM

## 2024-04-01 DIAGNOSIS — M17.12 PRIMARY OSTEOARTHRITIS OF LEFT KNEE: ICD-10-CM

## 2024-04-01 PROCEDURE — 1090F PRES/ABSN URINE INCON ASSESS: CPT | Performed by: ORTHOPAEDIC SURGERY

## 2024-04-01 PROCEDURE — G8399 PT W/DXA RESULTS DOCUMENT: HCPCS | Performed by: ORTHOPAEDIC SURGERY

## 2024-04-01 PROCEDURE — 1123F ACP DISCUSS/DSCN MKR DOCD: CPT | Performed by: ORTHOPAEDIC SURGERY

## 2024-04-01 PROCEDURE — 99213 OFFICE O/P EST LOW 20 MIN: CPT | Performed by: ORTHOPAEDIC SURGERY

## 2024-04-01 PROCEDURE — G8420 CALC BMI NORM PARAMETERS: HCPCS | Performed by: ORTHOPAEDIC SURGERY

## 2024-04-01 PROCEDURE — 3017F COLORECTAL CA SCREEN DOC REV: CPT | Performed by: ORTHOPAEDIC SURGERY

## 2024-04-01 PROCEDURE — G8427 DOCREV CUR MEDS BY ELIG CLIN: HCPCS | Performed by: ORTHOPAEDIC SURGERY

## 2024-04-01 PROCEDURE — 1036F TOBACCO NON-USER: CPT | Performed by: ORTHOPAEDIC SURGERY

## 2024-04-01 NOTE — PROGRESS NOTES
Chief Complaint   Patient presents with    Knee Pain     Left knee pain, feels heavy and swollen.       Subjective:     Patient ID: Sharita Askew is a 73 y.o..  female    Knee Pain  Patient presented for follow up left knee pain. She stated about 2 weeks ago her knee started to get swollen. She stated that she didn't injure the knee. She has taken Prednisone, Tylenol, and Excedrin with some relief.    Past Medical History:   Diagnosis Date    MRSA infection      Past Surgical History:   Procedure Laterality Date    BACK SURGERY      BONE INCISION AND DRAINAGE  4/19/2011    left knee    DILATION AND CURETTAGE OF UTERUS      HYSTERECTOMY (CERVIX STATUS UNKNOWN)         Current Outpatient Medications:     leflunomide (ARAVA) 10 MG tablet, , Disp: , Rfl:     predniSONE (DELTASONE) 5 MG tablet, Take 0.5 tablets by mouth daily, Disp: , Rfl:     hydroxychloroquine (PLAQUENIL) 200 MG tablet, Take 1 tablet by mouth daily, Disp: , Rfl:     rosuvastatin (CRESTOR) 10 MG tablet, Take by mouth, Disp: , Rfl:     Multiple Vitamin (THERA) TABS, Take  by mouth.  , Disp: , Rfl:   Allergies   Allergen Reactions    Demerol      Social History     Socioeconomic History    Marital status:      Spouse name: Not on file    Number of children: Not on file    Years of education: Not on file    Highest education level: Not on file   Occupational History    Not on file   Tobacco Use    Smoking status: Never    Smokeless tobacco: Never   Substance and Sexual Activity    Alcohol use: No    Drug use: Not on file    Sexual activity: Not on file   Other Topics Concern    Not on file   Social History Narrative    Not on file     Social Determinants of Health     Financial Resource Strain: Not on file   Food Insecurity: Not on file   Transportation Needs: Not on file   Physical Activity: Not on file   Stress: Not on file   Social Connections: Not on file   Intimate Partner Violence: Not on file   Housing Stability: Not on file

## 2024-04-05 ENCOUNTER — HOSPITAL ENCOUNTER (EMERGENCY)
Facility: HOSPITAL | Age: 74
Discharge: HOME | End: 2024-04-05
Attending: EMERGENCY MEDICINE
Payer: MEDICARE

## 2024-04-05 VITALS
TEMPERATURE: 97.1 F | BODY MASS INDEX: 23.09 KG/M2 | DIASTOLIC BLOOD PRESSURE: 90 MMHG | HEIGHT: 58 IN | WEIGHT: 110 LBS | SYSTOLIC BLOOD PRESSURE: 177 MMHG | RESPIRATION RATE: 18 BRPM | OXYGEN SATURATION: 98 % | HEART RATE: 69 BPM

## 2024-04-05 DIAGNOSIS — I10 PRIMARY HYPERTENSION: ICD-10-CM

## 2024-04-05 DIAGNOSIS — M17.12 PRIMARY OSTEOARTHRITIS OF LEFT KNEE: Primary | ICD-10-CM

## 2024-04-05 LAB
BASOPHILS # BLD AUTO: 0.01 X10*3/UL (ref 0–0.1)
BASOPHILS NFR BLD AUTO: 0.2 %
EOSINOPHIL # BLD AUTO: 0.24 X10*3/UL (ref 0–0.4)
EOSINOPHIL NFR BLD AUTO: 5.3 %
ERYTHROCYTE [DISTWIDTH] IN BLOOD BY AUTOMATED COUNT: 13.1 % (ref 11.5–14.5)
ERYTHROCYTE [SEDIMENTATION RATE] IN BLOOD BY WESTERGREN METHOD: 7 MM/H (ref 0–30)
HCT VFR BLD AUTO: 42.9 % (ref 36–46)
HGB BLD-MCNC: 13.7 G/DL (ref 12–16)
HOLD SPECIMEN: NORMAL
IMM GRANULOCYTES # BLD AUTO: 0.01 X10*3/UL (ref 0–0.5)
IMM GRANULOCYTES NFR BLD AUTO: 0.2 % (ref 0–0.9)
LYMPHOCYTES # BLD AUTO: 1.36 X10*3/UL (ref 0.8–3)
LYMPHOCYTES NFR BLD AUTO: 29.8 %
MCH RBC QN AUTO: 29.5 PG (ref 26–34)
MCHC RBC AUTO-ENTMCNC: 31.9 G/DL (ref 32–36)
MCV RBC AUTO: 92 FL (ref 80–100)
MONOCYTES # BLD AUTO: 0.5 X10*3/UL (ref 0.05–0.8)
MONOCYTES NFR BLD AUTO: 10.9 %
NEUTROPHILS # BLD AUTO: 2.45 X10*3/UL (ref 1.6–5.5)
NEUTROPHILS NFR BLD AUTO: 53.6 %
NRBC BLD-RTO: ABNORMAL /100{WBCS}
PLATELET # BLD AUTO: 271 X10*3/UL (ref 150–450)
RBC # BLD AUTO: 4.65 X10*6/UL (ref 4–5.2)
WBC # BLD AUTO: 4.6 X10*3/UL (ref 4.4–11.3)

## 2024-04-05 PROCEDURE — 36415 COLL VENOUS BLD VENIPUNCTURE: CPT | Performed by: EMERGENCY MEDICINE

## 2024-04-05 PROCEDURE — 99283 EMERGENCY DEPT VISIT LOW MDM: CPT

## 2024-04-05 PROCEDURE — 85652 RBC SED RATE AUTOMATED: CPT | Performed by: EMERGENCY MEDICINE

## 2024-04-05 PROCEDURE — 85025 COMPLETE CBC W/AUTO DIFF WBC: CPT | Performed by: EMERGENCY MEDICINE

## 2024-04-05 PROCEDURE — 2500000001 HC RX 250 WO HCPCS SELF ADMINISTERED DRUGS (ALT 637 FOR MEDICARE OP): Performed by: EMERGENCY MEDICINE

## 2024-04-05 RX ORDER — METOPROLOL SUCCINATE 25 MG/1
25 TABLET, EXTENDED RELEASE ORAL DAILY
Qty: 20 TABLET | Refills: 0 | Status: SHIPPED | OUTPATIENT
Start: 2024-04-05 | End: 2024-06-07 | Stop reason: ALTCHOICE

## 2024-04-05 RX ORDER — TRAMADOL HYDROCHLORIDE 50 MG/1
50 TABLET ORAL EVERY 8 HOURS PRN
Status: DISCONTINUED | OUTPATIENT
Start: 2024-04-05 | End: 2024-04-05 | Stop reason: HOSPADM

## 2024-04-05 RX ORDER — METOPROLOL TARTRATE 50 MG/1
50 TABLET ORAL ONCE
Status: COMPLETED | OUTPATIENT
Start: 2024-04-05 | End: 2024-04-05

## 2024-04-05 RX ORDER — TRAMADOL HYDROCHLORIDE 50 MG/1
50 TABLET ORAL EVERY 6 HOURS PRN
Qty: 12 TABLET | Refills: 0 | Status: SHIPPED | OUTPATIENT
Start: 2024-04-05 | End: 2024-04-08

## 2024-04-05 RX ADMIN — METOPROLOL TARTRATE 50 MG: 50 TABLET, FILM COATED ORAL at 09:17

## 2024-04-05 RX ADMIN — TRAMADOL HYDROCHLORIDE 50 MG: 50 TABLET, COATED ORAL at 09:18

## 2024-04-05 ASSESSMENT — COLUMBIA-SUICIDE SEVERITY RATING SCALE - C-SSRS
2. HAVE YOU ACTUALLY HAD ANY THOUGHTS OF KILLING YOURSELF?: NO
6. HAVE YOU EVER DONE ANYTHING, STARTED TO DO ANYTHING, OR PREPARED TO DO ANYTHING TO END YOUR LIFE?: NO
1. IN THE PAST MONTH, HAVE YOU WISHED YOU WERE DEAD OR WISHED YOU COULD GO TO SLEEP AND NOT WAKE UP?: NO

## 2024-04-05 ASSESSMENT — PAIN DESCRIPTION - PAIN TYPE: TYPE: CHRONIC PAIN

## 2024-04-05 ASSESSMENT — PAIN SCALES - GENERAL
PAINLEVEL_OUTOF10: 1
PAINLEVEL_OUTOF10: 1
PAINLEVEL_OUTOF10: 5 - MODERATE PAIN

## 2024-04-05 ASSESSMENT — PAIN - FUNCTIONAL ASSESSMENT: PAIN_FUNCTIONAL_ASSESSMENT: 0-10

## 2024-04-05 NOTE — ED PROVIDER NOTES
"[facility]  ED  Provider Note  No admission date for patient encounter.  Room/bed info not found      No chief complaint on file.       History of Present Illness:   Aileen Perea is a 73 y.o. female presenting to the ED for left knee pain, beginning several weeks ago.  The complaint has been persistent, moderate in severity, and worsened by nothing.  Patient has chronic knee pain.  She was seen earlier this week by Dr. Alexander orthopedics at [facility] in [facility].  X-rays revealed bilateral osteoarthritis.  Laboratory work showed no significant abnormalities.  She complains of ongoing knee pain and feels he has some \"worms moving about below her knee on the medial part of her calf.  She denies calf swelling or pain.  She has no history of DVT or PE.  She was offered surgical care for her knee but preferred medical management.  She has a history of septic joint in the past and is concerned that may be returning.  She denies any fever chills or signs systemic illness.      Review of Systems:   Pertinent positives and review of systems as noted above.  Remaining 10 review of systems is negative or noncontributory to today's episode of care.  Review of Systems       --------------------------------------------- PAST HISTORY ---------------------------------------------  Past Medical History:  has a past medical history of Personal history of other diseases of the respiratory system (12/21/2022).    Past Surgical History:  has a past surgical history that includes Other surgical history (07/19/2021); Other surgical history (07/19/2021); Other surgical history (07/19/2021); Other surgical history (07/19/2021); and Hysterectomy.    Social History:  reports that she has never smoked. She has never used smokeless tobacco. She reports that she does not drink alcohol and does not use drugs.    Family History: family history includes Breast cancer in her maternal grandmother. Unless otherwise noted, family history is non " "contributory    Patient's Medications   New Prescriptions    No medications on file   Previous Medications    LEFLUNOMIDE (ARAVA) 10 MG TABLET        PLAQUENIL 200 MG TABLET    Take 1 tablet (200 mg) by mouth once daily.    PREDNISONE (DELTASONE) 5 MG TABLET    Take 0.5 tablets (2.5 mg) by mouth once daily.    ROSUVASTATIN (CRESTOR) 10 MG TABLET    TAKE 1 TABLET BY MOUTH EVERY DAY   Modified Medications    No medications on file   Discontinued Medications    No medications on file      The patient’s home medications have been reviewed.    Allergies: Meperidine    -------------------------------------------------- RESULTS -------------------------------------------------  All laboratory and radiology results have been personally reviewed by myself   LABS:  Labs Reviewed   CBC WITH AUTO DIFFERENTIAL - Abnormal       Result Value    WBC 4.6      nRBC        RBC 4.65      Hemoglobin 13.7      Hematocrit 42.9      MCV 92      MCH 29.5      MCHC 31.9 (*)     RDW 13.1      Platelets 271      Neutrophils % 53.6      Immature Granulocytes %, Automated 0.2      Lymphocytes % 29.8      Monocytes % 10.9      Eosinophils % 5.3      Basophils % 0.2      Neutrophils Absolute 2.45      Immature Granulocytes Absolute, Automated 0.01      Lymphocytes Absolute 1.36      Monocytes Absolute 0.50      Eosinophils Absolute 0.24      Basophils Absolute 0.01     SEDIMENTATION RATE, AUTOMATED - Normal    Sedimentation Rate 7           RADIOLOGY:  Interpreted by Radiologist.  No orders to display       No results found for this or any previous visit (from the past 4464 hour(s)).  ------------------------- NURSING NOTES AND VITALS REVIEWED ---------------------------   The nursing notes within the ED encounter and vital signs as below have been reviewed.   BP (S) (!) 180/102 Comment: collaborated with Dr wilson, will treat BP  Pulse 93   Temp (S) 36.2 °C (97.1 °F)   Resp 18   Ht 1.473 m (4' 10\")   Wt 49.9 kg (110 lb)   SpO2 99%   BMI " 22.99 kg/m²   Oxygen Saturation Interpretation: Normal      ---------------------------------------------------PHYSICAL EXAM--------------------------------------  Physical Exam   Constitutional/General: Alert and oriented x3, well appearing, non toxic in NAD  Head: Normocephalic and atraumatic  Eyes: PERRL, EOMI, conjunctiva normal, sclera non icteric  Mouth: Oropharynx clear, handling secretions, no trismus, no asymmetry of the posterior oropharynx or uvular edema  Neck: Supple, full ROM, non tender to palpation in the midline, no stridor, no crepitus, no meningeal signs  Respiratory: Lungs clear to auscultation bilaterally, no wheezes, rales, or rhonchi. Not in respiratory distress  Cardiovascular:  Regular rate. Regular rhythm. No murmurs, gallops, or rubs. 2+ distal pulses  Chest: No chest wall tenderness  GI:  Abdomen Soft, Non tender, Non distended.  +BS. No organomegaly, no palpable masses,  No rebound, guarding, or rigidity.   Musculoskeletal: Moves all extremities x 4. Warm and well perfused, no clubbing, cyanosis, or edema. Capillary refill <3 seconds left knee is carefully examined.  She has normal range of motion, there is no significant joint effusion.  There is no warmth.  There is no tenderness to palpation.  The calf is nontender to palpation with no swelling cord or Homans' sign.  Integument: skin warm and dry. No rashes.  No evidence of erythema or warmth in the left lower extremity.  Lymphatic: no lymphadenopathy noted  Neurologic: No focal deficits, symmetric strength 5/5 in the upper and lower extremities bilaterally  Psychiatric: Normal Affect    Procedures    ------------------------------ ED COURSE/MEDICAL DECISION MAKING----------------------  Diagnoses as of 04/05/24 1005   Primary osteoarthritis of left knee   Primary hypertension      Patient has chronic knee pain.  There is no clinical evidence of infection joint effusion or injury.  Recent x-ray showed osteoarthritis.  CBC is normal.   ESR is normal.  There is no evidence of septic joint.  The patient blood pressure has been treated with metoprolol pain has been treated with tramadol.  She is stable for outpatient management.  I have advised to continue metoprolol as prescribed and follow-up with primary care doctor in 3 to 5 days for recheck.    She is to monitor blood pressure daily until seen by her physician.  I suggest that she follow-up with Dr. Alexander for further orthopedic care of her chronic knee pains.      Medical Decision Making:   Discharged to home  Diagnoses as of 04/05/24 1005   Primary osteoarthritis of left knee   Primary hypertension      Counseling:   The emergency provider has spoken with the patient and discussed today’s results, in addition to providing specific details for the plan of care and counseling regarding the diagnosis and prognosis.  Questions are answered at this time and they are agreeable with the plan.        --------------------------------- IMPRESSION AND DISPOSITION ---------------------------------        IMPRESSION  1. Primary osteoarthritis of left knee    2. Primary hypertension        DISPOSITION  Disposition: Discharge to home  Patient condition is fair      Billing Provider Critical Care Time: 0 minutes     Rupert Billingsley MD  04/05/24 1008

## 2024-04-05 NOTE — ED TRIAGE NOTES
BIB self from home 2/2 Left knee pain and swelling acute on chronic being treated by Elise on prednisone / PMH RA / currently seen at  on 03/29

## 2024-04-09 DIAGNOSIS — M17.12 PRIMARY OSTEOARTHRITIS OF LEFT KNEE: Primary | ICD-10-CM

## 2024-04-15 ENCOUNTER — OFFICE VISIT (OUTPATIENT)
Dept: ORTHOPEDIC SURGERY | Age: 74
End: 2024-04-15
Payer: MEDICARE

## 2024-04-15 ENCOUNTER — HOSPITAL ENCOUNTER (OUTPATIENT)
Dept: RESPIRATORY THERAPY | Facility: HOSPITAL | Age: 74
Discharge: HOME | End: 2024-04-15
Payer: MEDICARE

## 2024-04-15 VITALS — BODY MASS INDEX: 23.09 KG/M2 | HEIGHT: 58 IN | TEMPERATURE: 98 F | WEIGHT: 110 LBS

## 2024-04-15 DIAGNOSIS — J84.9 INTERSTITIAL LUNG DISEASE (MULTI): Primary | ICD-10-CM

## 2024-04-15 DIAGNOSIS — M17.12 PRIMARY OSTEOARTHRITIS OF LEFT KNEE: Primary | ICD-10-CM

## 2024-04-15 PROCEDURE — 94729 DIFFUSING CAPACITY: CPT | Performed by: PEDIATRICS

## 2024-04-15 PROCEDURE — 94060 EVALUATION OF WHEEZING: CPT

## 2024-04-15 PROCEDURE — 94618 PULMONARY STRESS TESTING: CPT | Performed by: PEDIATRICS

## 2024-04-15 PROCEDURE — 99213 OFFICE O/P EST LOW 20 MIN: CPT | Performed by: ORTHOPAEDIC SURGERY

## 2024-04-15 PROCEDURE — 1090F PRES/ABSN URINE INCON ASSESS: CPT | Performed by: ORTHOPAEDIC SURGERY

## 2024-04-15 PROCEDURE — G8420 CALC BMI NORM PARAMETERS: HCPCS | Performed by: ORTHOPAEDIC SURGERY

## 2024-04-15 PROCEDURE — 94618 PULMONARY STRESS TESTING: CPT

## 2024-04-15 PROCEDURE — 94727 GAS DIL/WSHOT DETER LNG VOL: CPT | Performed by: PEDIATRICS

## 2024-04-15 PROCEDURE — 94727 GAS DIL/WSHOT DETER LNG VOL: CPT

## 2024-04-15 PROCEDURE — 3017F COLORECTAL CA SCREEN DOC REV: CPT | Performed by: ORTHOPAEDIC SURGERY

## 2024-04-15 PROCEDURE — 94664 DEMO&/EVAL PT USE INHALER: CPT

## 2024-04-15 PROCEDURE — 94729 DIFFUSING CAPACITY: CPT

## 2024-04-15 PROCEDURE — G8399 PT W/DXA RESULTS DOCUMENT: HCPCS | Performed by: ORTHOPAEDIC SURGERY

## 2024-04-15 PROCEDURE — G8427 DOCREV CUR MEDS BY ELIG CLIN: HCPCS | Performed by: ORTHOPAEDIC SURGERY

## 2024-04-15 PROCEDURE — 94060 EVALUATION OF WHEEZING: CPT | Performed by: PEDIATRICS

## 2024-04-15 PROCEDURE — 1123F ACP DISCUSS/DSCN MKR DOCD: CPT | Performed by: ORTHOPAEDIC SURGERY

## 2024-04-15 PROCEDURE — 20610 DRAIN/INJ JOINT/BURSA W/O US: CPT | Performed by: ORTHOPAEDIC SURGERY

## 2024-04-15 PROCEDURE — 1036F TOBACCO NON-USER: CPT | Performed by: ORTHOPAEDIC SURGERY

## 2024-04-15 RX ORDER — TRIAMCINOLONE ACETONIDE 40 MG/ML
40 INJECTION, SUSPENSION INTRA-ARTICULAR; INTRAMUSCULAR ONCE
Status: COMPLETED | OUTPATIENT
Start: 2024-04-15 | End: 2024-04-15

## 2024-04-15 RX ADMIN — TRIAMCINOLONE ACETONIDE 40 MG: 40 INJECTION, SUSPENSION INTRA-ARTICULAR; INTRAMUSCULAR at 15:01

## 2024-04-15 NOTE — PROGRESS NOTES
Chief Complaint   Patient presents with    Knee Pain     Left knee pain follow up. Has a little bit of pain in the knee still. Pain in knee is improved since last visit. Knee still feels heavy compared to other knee.        Subjective:     Patient ID: Sharita Askew is a 73 y.o..  female    Knee Pain  Patient presented for follow up left knee pain. She states she feels like something is in there.  She has pain with ambulation.    Past Medical History:   Diagnosis Date    MRSA infection      Past Surgical History:   Procedure Laterality Date    BACK SURGERY      BONE INCISION AND DRAINAGE  4/19/2011    left knee    DILATION AND CURETTAGE OF UTERUS      HYSTERECTOMY (CERVIX STATUS UNKNOWN)         Current Outpatient Medications:     leflunomide (ARAVA) 10 MG tablet, , Disp: , Rfl:     predniSONE (DELTASONE) 5 MG tablet, Take 0.5 tablets by mouth daily, Disp: , Rfl:     hydroxychloroquine (PLAQUENIL) 200 MG tablet, Take 1 tablet by mouth daily, Disp: , Rfl:     rosuvastatin (CRESTOR) 10 MG tablet, Take by mouth, Disp: , Rfl:     Multiple Vitamin (THERA) TABS, Take  by mouth.  , Disp: , Rfl:   Allergies   Allergen Reactions    Demerol      Social History     Socioeconomic History    Marital status:      Spouse name: Not on file    Number of children: Not on file    Years of education: Not on file    Highest education level: Not on file   Occupational History    Not on file   Tobacco Use    Smoking status: Never    Smokeless tobacco: Never   Substance and Sexual Activity    Alcohol use: No    Drug use: Not on file    Sexual activity: Not on file   Other Topics Concern    Not on file   Social History Narrative    Not on file     Social Determinants of Health     Financial Resource Strain: Not on file   Food Insecurity: Not on file   Transportation Needs: Not on file   Physical Activity: Not on file   Stress: Not on file   Social Connections: Not on file   Intimate Partner Violence: Not on file   Housing

## 2024-04-18 ENCOUNTER — TELEPHONE (OUTPATIENT)
Dept: PULMONOLOGY | Facility: CLINIC | Age: 74
End: 2024-04-18
Payer: MEDICARE

## 2024-04-18 LAB
MGC ASCENT PFT - FEV1 - POST: 1.1
MGC ASCENT PFT - FEV1 - POST: 1.1
MGC ASCENT PFT - FEV1 - PRE: 1.1
MGC ASCENT PFT - FEV1 - PRE: 1.1
MGC ASCENT PFT - FEV1 - PREDICTED: 1.64
MGC ASCENT PFT - FEV1 - PREDICTED: 1.64
MGC ASCENT PFT - FVC - POST: 1.28
MGC ASCENT PFT - FVC - POST: 1.28
MGC ASCENT PFT - FVC - PRE: 1.37
MGC ASCENT PFT - FVC - PRE: 1.37
MGC ASCENT PFT - FVC - PREDICTED: 2.07
MGC ASCENT PFT - FVC - PREDICTED: 2.07

## 2024-04-18 NOTE — TELEPHONE ENCOUNTER
"Pt called this morning and said that she would like to tt you she said that dr Mccarthy ordered her prednisone she stated that there is \"something moving in her knee\" she said that it isn't getting any better and she said that she still gets SOB and she said that she doesn't think that she can wait until she sees Dr Mccarthy next week. She kept referring to her knee and I asked her if she had contacted her Dr for that and she said yes but really needs to tt  you I told her that I would send you a message and that your schedule is full today and that you will get a hold of her as soon as you can  "

## 2024-04-19 NOTE — TELEPHONE ENCOUNTER
She is vey confused. She see's dr ng on Tuesday for test results. She also did just see ortho for her knee. I think she has our off and ortho mixed up

## 2024-04-23 ENCOUNTER — TELEPHONE (OUTPATIENT)
Dept: CARDIOLOGY | Facility: CLINIC | Age: 74
End: 2024-04-23

## 2024-04-23 ENCOUNTER — OFFICE VISIT (OUTPATIENT)
Dept: PULMONOLOGY | Facility: CLINIC | Age: 74
End: 2024-04-23
Payer: MEDICARE

## 2024-04-23 VITALS
HEART RATE: 83 BPM | BODY MASS INDEX: 23.97 KG/M2 | SYSTOLIC BLOOD PRESSURE: 180 MMHG | DIASTOLIC BLOOD PRESSURE: 86 MMHG | WEIGHT: 114.7 LBS | OXYGEN SATURATION: 99 %

## 2024-04-23 DIAGNOSIS — Z79.52 ON PREDNISONE THERAPY: ICD-10-CM

## 2024-04-23 DIAGNOSIS — R00.2 PALPITATIONS: ICD-10-CM

## 2024-04-23 DIAGNOSIS — J84.9 ILD (INTERSTITIAL LUNG DISEASE) (MULTI): Primary | ICD-10-CM

## 2024-04-23 PROCEDURE — 99214 OFFICE O/P EST MOD 30 MIN: CPT | Performed by: INTERNAL MEDICINE

## 2024-04-23 PROCEDURE — 1036F TOBACCO NON-USER: CPT | Performed by: INTERNAL MEDICINE

## 2024-04-23 PROCEDURE — 3079F DIAST BP 80-89 MM HG: CPT | Performed by: INTERNAL MEDICINE

## 2024-04-23 PROCEDURE — 1159F MED LIST DOCD IN RCRD: CPT | Performed by: INTERNAL MEDICINE

## 2024-04-23 PROCEDURE — 3077F SYST BP >= 140 MM HG: CPT | Performed by: INTERNAL MEDICINE

## 2024-04-23 NOTE — PATIENT INSTRUCTIONS
Mrs Perea, it was a pleasure seeing you in clinic today. We discussed the following:     -Your breathing test is slightly decreased but your walking test is improved   -You can continue your prednisone at 2.5 mg daily  -I made a referral to the heart specialist for your palpitations     Will see you back in clinic in 3-4 months with repeat breathing test and walking test

## 2024-04-23 NOTE — PROGRESS NOTES
History of Present Illness  Mrs. Perea is a 73 y.o.  woman never a smoker follows up in pulmonary clinic for ILD, most likely organizing pneumonia     PCP: Dr. Damon     HPI:  New patient here today to establish care. Patient had a CT cardiac scoring which did show bilateral groundglass densities. She also had a chest x-ray which showed the same. Patient was not feeling well at the time she was treated with antibiotics and prednisone and tells me she greatly improved. She still has residual shortness of breath and coughing. This is new patient has always been very healthy. She states she has been short of breath since her sinus infection she had last December. She was tested for Covid in December it was negative has not been tested since although her chest CT does look like it could have been Covid. She still has some rhonchi in her bilateral lower lobes.i am going to order her another round of antibiotics and prednisone and repeat a chest CT and also order her a PFT.     11/17/2021: Ms Perea feels better after prednisone and azithromycin. She is able to walk on flat surfaces without issue but walking up hill is still a struggle for her. She had a repeat CT done which shows mosaic attenuation which is unchanged. She also had a PFT done which shows moderately severe restriction with severe reduction in diffusing capacity.     12/16/2021: Ms Perea is doing ok breathing is fine. I reviewed the rheumatologic labs with her (all are normal)     02/15/2022: Since the last visit, patient's breathing has been improving. No new chest pain, cough, sputum, fever, chills or night sweats. She is walking 14318 steps daily without limitations.     05/10/2022: Since the last visit, patient's breathing has been worsening. Continues to cough and has reduced exercise capacity. Had bronchoscopy with biopsy done (results below).      06/14/2022 since her last visit she feels that her breathing is greatly improved. She has  been taking 40 mg of prednisone with great improvement in her symptoms. She continues to have an occasional dry cough. No significant side effects of prednisone outside of some mood changes and increased energy. She had repeat high-resolution CT scan done as well as a breathing test and a 6-minute walk test (results below).     08/30/2022: Since the last visit, patient's breathing has been improving. CAT 3. Patient had repeat breathing test, done (results below). She is down to 20mg of prednisone. She gained about 4-5 pounds.      11/1/2022: Complains mostly of neck pain. Since the last visit, patient's breathing is mostly unchanged. Stopped the prednisone on 10/07/2022. No new chest pain, cough, sputum, fever, chills or night sweats. Patient had repeat breathing test, 6MWT, CT scan done (results below).      01/10/2023: Going to PT for shoulder and back pain. Feels her breathing is mostly unchagned. Coughs occasionally, uses her nasal sprays. No new chest pain, cough, sputum, fever, chills or night sweats. She was in the emergency department on 11/02/2022, a CTPE was done and was negative for PE. Was seen again in the ED on 12/02, CXR looked slightly worse, she completed a course of levaquin and Medrol pack. Partially improved today.     02/07/2023: Since the last visit, patient's breathing is mostly unchanged. No new chest pain, cough, sputum, fever, chills or night sweats. Complains mostly of joint pain. Had repeat CT done (results below).      03/07/2023: Since the last visit, patient's breathing has been improving. She was saw a rheumatologist and had ?lab work, and hw was started on Plaquenil. 02/16 she started the Plaquenil. Since the last visit, patient's breathing has been improving. CAT of 2.      06/06/2023: Since the last visit, patient's breathing is mostly unchanged. No new chest pain, cough, sputum, fever, chills or night sweats. Rheumatology increased her palquenil to 400mg. Had a fall and hurt her  left knee. Patient had repeat breathing test, 6MWT, CT scan done (results below).      08/29/2023: She is now up to 400mg for plaquenil and rheum added leflunomide. Since the last visit, patient's breathing is mostly unchanged. No new chest pain, cough, sputum, fever, chills or night sweats. Patient had repeat CT scan done (results below). she is still taking 5 mg of prednisone    12/5/2023: Since the last visit, patient's breathing is better. No hospital admissions or ED visits. No new chest pain, cough, sputum, fever, chills or night sweats. Patient active in everyday life goes on walks, climbs stairs, does participates in regimented exercise. Had sinus surgery in August which she thinks helped. Patient had repeat breathing test, 6MWT, CT scan done (results below). Patient compliant with prescribed inhalers using daily plaquenil 200mg once daily and leflulonimde 10mg daily and prednisone 2.5mg.    4/23/2024:  Since the last visit, patient's breathing is mostly unchanged. No hospital admissions or ED visits. No new chest pain, cough, sputum, fever, chills or night sweats. About 5 weeks ago she had knee swelling in her knee and he pred was increased to 20mg for 5 days and was decreased back to her home dose to 2.5mg. She is experiencing palpitations. She was prescribed metoprolol by ED attending. Patient had repeat breathing test, 6MWT (results below).      Previous pulmonary history:   She has no history of recurrent infections, or lung disease as a child. She had no previous lung hx, never on oxygen or inhaler therapy. She currently is on no supplemental oxygen.      Inhalers/nebulized medications: none     Hospitalization History:  She has not been hospitalized over the last year for breathing related problem.     Sleep history:  Denies snoring, apneas, feeling tired during the day or taking naps during the day. Does not feel tired during the day or take frequent naps.  STOP-BANG score of 2     Comorbidities:    ASCVD  anemia     SH:  smoking: never but second hand smoke from  of 50 years  drinking: none  illicit drug use: none     Occupation: (Full questionnaire on exposures obtained, discussed with the patient and scanned to EMR)  worked as RN  No known exposure to asbestos, silica or beryllium     Family History:  No family history of lung diseases or cancer     Imaging history: (I have personally reviewed the imaging below)  07/24/2023 HRCT with great improvement in inflammation and minimal residual   01/12/2023 HRCT with slight progression of GGO  10/24/2022 HRCT with great improvement but residual GGO in the mediastinal area with some progression of traction bronchiectasis  06/08/2022 (compared to 04/2022) HRCT with great improvement in GGO opacities with some residual  8/11/2021 space a CT cardiac scoring shows a groundglass density bilaterally     PFTs:  04/15/2024 -> Ratio of 0.86/FEV1 1.10L (67%)(no BD response)/FVC 1.28L (61%)/TLC 50%/RVtoTLC ratio 0.53/DLCO 59%  05/30/2023 -> Ratio of 0.86/FEV1 1.23L (74%)(no BD response)/FVC 1.43L (64%)/TLC 54%/RVtoTLC ratio 0.36/DLCO 32%  10/24/2022 -> Ratio of 0.89/FEV1 1.25L (74%)(no BD response)/FVC 1.4L (62%)/DLCO 32 -> 52%  08/23/2022: -> Ratio of 0.9/FEV1 1.37L (81%) (no BD response)/FVC 1.53L (68%)/DLCO 37->60%  06/08/2022 -> FEV1/FVC ratio 0.89/FEV1 1.31L (77%) (no BD response)/FVC 1.48L (65%)     6 MWTs:  04/15/2024 ->on RA, 446m. Peak SpO2 of 100%. Reginald SpO2 95%.   05/30/2023 ->on RA, 385m. Peak SpO2 of 100%. Reginald SpO2 90%.   10/24/2022 ->on RA, 448m. Peak SpO2 of 99%. Reginald SpO2 93%.   06/08/2022 ->on RA, 455m. Peak SpO2 of 100%. Reginald SpO2 93%.      Lung biopsy:   04/20/2022: BAL done but no differential or CD4/CD8 ratio reported. Lymph node biopsy with non-caseating granulomas. TBBx with organizing PNA.      Echo:   8/2021-> Normal EF 50-55%, mild aortic valve stenosis, moderate aortic valve cusp calcification, mild aortic valve regurgitation     Labs:  :  Hb 15.3 , Eos <250, Bicarb 26, Creat 053        Review of Systems:   Pertinent positives and review of systems as noted above.  Remaining 10 review of systems is negative or noncontributory to today's episode of care.     Past Medical History:   Diagnosis Date    Personal history of other diseases of the respiratory system 12/21/2022    History of acute bronchitis        Medication Documentation Review Audit       Reviewed by Iris Main MA (Medical Assistant) on 04/23/24 at 0846      Medication Order Taking? Sig Documenting Provider Last Dose Status   leflunomide (Arava) 10 mg tablet [id number] No  Historical Provider, MD Taking Active   metoprolol succinate XL (Toprol-XL) 25 mg 24 hr tablet [id number]  Take 1 tablet (25 mg) by mouth once daily for 20 days. Do not crush or chew. Rupert Billingsley MD  Active   PlaqueniL 200 mg tablet [id number] No Take 1 tablet (200 mg) by mouth once daily. Historical Provider, MD Taking Active   predniSONE (Deltasone) 5 mg tablet [id number]  Take 0.5 tablets (2.5 mg) by mouth once daily. Orestes Mccarthy MD  Active   rosuvastatin (Crestor) 10 mg tablet [id number]  TAKE 1 TABLET BY MOUTH EVERY DAY Mariya Salazar, APRN-CNP  Active                 Physical Exam  Constitutional: Alert and oriented. In no acute distress. Well developed, well nourished.  Head and Face: Normal.   Oropharynx: normal.  Neck: No neck mass observed.  Pulmonary: Chest is normal to inspection. No increased work of breathing or signs of respiratory distress.   CV: No obvious peripheral edema.  MSK: normal gait and station. No clubbing or cyanosis of the fingernails.  Skin: Normal skin color and pigmentation, normal skin turgor, and no rash.  Neurologic: EOMI. Moving all 4 extremities.  Psychiatric: Intact judgement and insight.     Provider Impressions       # Palpitation 4/23/2024:       Will refer to cardiology for holter monitor     # Ground glass densities on CT consistent with ILD PFT restrictive  -  Rheumatologic labs normal HP panel normal  04/20/2022: BAL done without infection. Lymph node biopsy with non-caseating granulomas. TBBx with organizing PNA.   -tapered off prednisone on October 7th 2022  (started on 05/14/2022 at 40mg)  She was in the emergency department on 11/02/2022, a CTPE was done and was negative for PE. Was seen again in the ED on 12/02, CXR looked slightly worse, she completed a course of levaquin and Medrol pack. Partially improved today   -Given CT progression will restart prednisone (02/07/2023) at 20mg and taper by 5mg every 2 weeks.  -will stay on 5mg until next CT     Discussed at ILD multidisciplinary meeting 5/12/22   - MDD Consensus Diagnosis: Unclear - concern for alveolar sarcoid vs lymphoproliferative process.    - Recommendation and Management Plan: Recommend biopsy vs treatment.      # Chronic inflammatory joint pain:  -serologies done and negative in Nov 2021  -Saw rheumatology. Labs Pending. Started on Plaquenil     # Chronic sinusitis and deviated septum:  -Had surgery in August with Dr. Bustos from ENT       f/u in 3-4 months

## 2024-04-24 DIAGNOSIS — R00.2 PALPITATIONS: ICD-10-CM

## 2024-04-25 ENCOUNTER — TELEPHONE (OUTPATIENT)
Dept: PULMONOLOGY | Facility: CLINIC | Age: 74
End: 2024-04-25
Payer: MEDICARE

## 2024-04-25 ENCOUNTER — HOSPITAL ENCOUNTER (OUTPATIENT)
Dept: CARDIOLOGY | Facility: HOSPITAL | Age: 74
Discharge: HOME | End: 2024-04-25
Payer: MEDICARE

## 2024-04-25 DIAGNOSIS — R00.2 PALPITATIONS: ICD-10-CM

## 2024-04-25 PROCEDURE — 93246 EXT ECG>7D<15D RECORDING: CPT

## 2024-04-25 PROCEDURE — 93248 EXT ECG>7D<15D REV&INTERPJ: CPT | Performed by: INTERNAL MEDICINE

## 2024-04-25 NOTE — TELEPHONE ENCOUNTER
Pt called today and said that the orders weren't in the system for the pft and 6 min walk I called her back and let her know that I see the orders from dr ng and they are active. Pt thanked me   04.29.24 I called and spoke with Stephanie ALVARADO And she stated that she sees 3 orders from Dr. Ng I did let her know that all 3 were in procedures she said that she would reach out to the pt and get her scheduled

## 2024-04-26 NOTE — ADDENDUM NOTE
Encounter addended by: Adrianne Simon RRT on: 4/26/2024 12:23 PM   Actions taken: Order Reconciliation Section accessed, Visit diagnoses modified, Order list changed, Diagnosis association updated

## 2024-04-26 NOTE — ADDENDUM NOTE
Encounter addended by: Adrianne Simon RRT on: 4/26/2024 12:20 PM   Actions taken: Visit diagnoses modified, Order list changed, Diagnosis association updated

## 2024-05-13 ENCOUNTER — CLINICAL SUPPORT (OUTPATIENT)
Dept: CARDIAC REHAB | Facility: HOSPITAL | Age: 74
End: 2024-05-13
Payer: MEDICARE

## 2024-05-13 DIAGNOSIS — J84.9 INTERSTITIAL LUNG DISEASE (MULTI): ICD-10-CM

## 2024-05-16 ENCOUNTER — APPOINTMENT (OUTPATIENT)
Dept: CARDIOLOGY | Facility: CLINIC | Age: 74
End: 2024-05-16
Payer: MEDICARE

## 2024-05-20 ENCOUNTER — CLINICAL SUPPORT (OUTPATIENT)
Dept: CARDIAC REHAB | Facility: HOSPITAL | Age: 74
End: 2024-05-20
Payer: MEDICARE

## 2024-05-20 DIAGNOSIS — J84.9 ILD (INTERSTITIAL LUNG DISEASE) (MULTI): Primary | ICD-10-CM

## 2024-05-20 PROCEDURE — G0239 OTH RESP PROC, GROUP: HCPCS | Performed by: PEDIATRICS

## 2024-05-20 NOTE — PROGRESS NOTES
Pulmonary Daily Rehabilitation Assessment    Name: Aileen Perea  Medical Record Number: [id number]  YOB: 1950    Today’s Date: 5/20/2024  Primary Care Physician: ALEXANDRO Noyola-CUCO    Session #: 3    Oxygen Assessment    SP02 rest: 98 %  SP02 exertion: 93 %    Oxygen Use  Requires supplemental O2:    Liters at Rest: ra   Liters with Exertion: ra  Using O2 as prescribed: na      Weight Management    Height: 4'10  Weight: 114lbs  BMI:        Daily Activity Log   Modality METS Load Duration   1 Pre-Exercise  6/0      2 Treadmill   11/2 1.9 1.2 11;00   3 Nustep 4000  9/1 2.3-2.4 3.0 11:00   4 Recumbent Cycle SPORT ART  11/2 RPM 68-69 1.0 15:00   5 Weights       6 UBE      7 Rower      8 Aero Bike      9 Cardio-fit      10 Post-Exercise  6/0.5            Other Core Components/Risk Factor Assessment:    Blood Pressure Management:  Hx of Hypertension: na  Resting BP: 135/76  POST BP: 117/73      General Comments:  EDUCATION:COPD/ILD - Patient oriented to gym and equipment - book given with exercises 1-3 shown & explained      Rehab Staff Signature: Vee Tamez, RRT

## 2024-05-22 ENCOUNTER — APPOINTMENT (OUTPATIENT)
Dept: CARDIAC REHAB | Facility: HOSPITAL | Age: 74
End: 2024-05-22
Payer: MEDICARE

## 2024-05-23 ENCOUNTER — CLINICAL SUPPORT (OUTPATIENT)
Dept: CARDIAC REHAB | Facility: HOSPITAL | Age: 74
End: 2024-05-23
Payer: MEDICARE

## 2024-05-23 DIAGNOSIS — J84.9 INTERSTITIAL LUNG DISEASE (MULTI): Primary | ICD-10-CM

## 2024-05-23 PROCEDURE — G0239 OTH RESP PROC, GROUP: HCPCS | Performed by: PEDIATRICS

## 2024-05-23 NOTE — PROGRESS NOTES
Pulmonary Daily Rehabilitation Assessment    Name: Aileen Perea  Medical Record Number: [id number]  YOB: 1950    Today’s Date: 5/23/2024  Primary Care Physician: DIAMOND Noyola    Session #: 3    Oxygen Assessment    SP02 rest: 100 %  SP02 exertion: 94 %    Oxygen Use  Requires supplemental O2:    Liters at Rest: ra   Liters with Exertion: ra  Using O2 as prescribed: na      Weight Management    Height: 4'10  Weight: 114lb.  BMI:        Daily Activity Log   Modality METS Load Duration   1 Pre-Exercise  6/0      2 Treadmill       3 Nustep 4000   9/2 2.3-2.5 3.0 20:00   4 Recumbent Cycle scifit  11/2 W15-22 3.2 10:00   5 Weights       6 UBE      7 Rower  9/2 w37 Mph 2.2 7:00   8 Aero Bike      9 Cardio-fit      10 Post-Exercise  6/0            Other Core Components/Risk Factor Assessment:    Blood Pressure Management:  Hx of Hypertension: yes  Resting BP: 151/72  POST BP: 150/85      General Comments:        Rehab Staff Signature: Vee Tamez, RRT

## 2024-05-29 ENCOUNTER — CLINICAL SUPPORT (OUTPATIENT)
Dept: CARDIAC REHAB | Facility: HOSPITAL | Age: 74
End: 2024-05-29
Payer: MEDICARE

## 2024-05-29 ENCOUNTER — APPOINTMENT (OUTPATIENT)
Dept: CARDIAC REHAB | Facility: HOSPITAL | Age: 74
End: 2024-05-29
Payer: MEDICARE

## 2024-05-29 DIAGNOSIS — J84.9 INTERSTITIAL LUNG DISEASE (MULTI): Primary | ICD-10-CM

## 2024-05-29 PROCEDURE — G0239 OTH RESP PROC, GROUP: HCPCS | Performed by: PEDIATRICS

## 2024-05-29 NOTE — PROGRESS NOTES
Pulmonary Daily Rehabilitation Assessment    Name: Aileen Perea  Medical Record Number: [id number]  YOB: 1950    Today’s Date: 5/29/2024  Primary Care Physician: DIAMOND Noyola    Session #: 5    Oxygen Assessment    SP02 rest: 99 %  SP02 exertion: 94 %    Oxygen Use  Requires supplemental O2:    Liters at Rest: ra   Liters with Exertion: ra  Using O2 as prescribed: na      Weight Management    Height: 4'10  Weight: 114lbs  BMI:        Daily Activity Log   Modality METS Load Duration   1 Pre-Exercise   6/0      2 Treadmill   9/2 2,1 1.5 18:00   3 Nustep 4000       4 Recumbent Cycle sportart 9/3 [id number] 1.0 17:00   5 Weights       6 UBE  11/1 W0-5 Rpm37 -38 10:00   7 Rower      8 Aero Bike      9 Cardio-fit      10 Post-Exercise  6/0            Other Core Components/Risk Factor Assessment:    Blood Pressure Management:  Hx of Hypertension: yes  Resting BP: 151/81  POST BP: 135/84      General Comments:  Education: Exercise & handouts given      Rehab Staff Signature: Vee Tamez, RRT

## 2024-05-30 ENCOUNTER — CLINICAL SUPPORT (OUTPATIENT)
Dept: CARDIAC REHAB | Facility: HOSPITAL | Age: 74
End: 2024-05-30
Payer: MEDICARE

## 2024-05-30 DIAGNOSIS — J84.9 INTERSTITIAL LUNG DISEASE (MULTI): Primary | ICD-10-CM

## 2024-05-30 PROCEDURE — G0239 OTH RESP PROC, GROUP: HCPCS | Performed by: PEDIATRICS

## 2024-05-30 NOTE — PROGRESS NOTES
Pulmonary Daily Rehabilitation Assessment    Name: Aileen Perea  Medical Record Number: [id number]  YOB: 1950    Today’s Date: 5/30/2024  Primary Care Physician: DIAMOND Noyola    Session #: 6    Oxygen Assessment    SP02 rest: 100 %  SP02 exertion: 94%    Oxygen Use  Requires supplemental O2:    Liters at Rest: ra   Liters with Exertion: ra  Using O2 as prescribed: na      Weight Management    Height: 4'10  Weight: 114lbs  BMI:        Daily Activity Log   Modality METS Load Duration   1 Pre-Exercise9/0.5      2 Treadmill       3 Nustep 4000   9/2 S.5-s.7 4.0 25;00   4 Recumbent Cycle       5 Weights       6 UBE  9/2 W0-5 RPM42-  51 15:00   7 Rower      8 Aero Bike  10/3 W50-54 RPM30- 31 10:00   9 Cardio-fit      10 Post-Exercise  9/1            Other Core Components/Risk Factor Assessment:    Blood Pressure Management:  Hx of Hypertension: YES  Resting BP: 147/83  POST BP: 144/76      General Comments:  dISCUSSED PURSELIP & ABDOMINAL BREATHING      Rehab Staff Signature: Vee Tamez, RRT

## 2024-06-03 ENCOUNTER — CLINICAL SUPPORT (OUTPATIENT)
Dept: CARDIAC REHAB | Facility: HOSPITAL | Age: 74
End: 2024-06-03
Payer: MEDICARE

## 2024-06-03 DIAGNOSIS — J84.9 INTERSTITIAL LUNG DISEASE (MULTI): Primary | ICD-10-CM

## 2024-06-03 PROCEDURE — G0239 OTH RESP PROC, GROUP: HCPCS | Performed by: PEDIATRICS

## 2024-06-03 NOTE — PROGRESS NOTES
Pulmonary Daily Rehabilitation Assessment    Name: Aileen Perea  Medical Record Number: [id number]  YOB: 1950    Today’s Date: 6/3/2024  Primary Care Physician: DIAMOND Noyola    Session #: 8    Oxygen Assessment    SP02 rest: 100 %  SP02 exertion: 93 %    Oxygen Use  Requires supplemental O2:    Liters at Rest: ra   Liters with Exertion: ra  Using O2 as prescribed: na      Weight Management    Height: 4'10  Weight: 114.5lbs  BMI:        Daily Activity Log   Modality METS Load Duration   1 Pre-Exercise  6/0      2 Treadmill   9/2 2.3  inc 1.7 20:00   3 Nustep 4000   9/2 2.7-2.8 5.0 25:00   4 Recumbent Cycle       5 Weights       6 UBE  9/1 W0-5 [id number] 10:00   7 Rower      8 Aero Bike      9 Cardio-fit      10 Post-Exercise  6/0            Other Core Components/Risk Factor Assessment:    Blood Pressure Management:  Hx of Hypertension: yes  Resting BP: 148/80  POST BP: 147/78      General Comments:  Education: Airway clearance      Rehab Staff Signature: Vee Tamez, RRT

## 2024-06-05 ENCOUNTER — APPOINTMENT (OUTPATIENT)
Dept: CARDIAC REHAB | Facility: HOSPITAL | Age: 74
End: 2024-06-05
Payer: MEDICARE

## 2024-06-06 NOTE — PROGRESS NOTES
Primary Care Physician: Mariya Salazar, ALEXANDRO-CNP  Primary Cardiologist:      Date of Visit: 06/07/2024  9:00 AM EDT  Location of visit:  [facility]   Type of Visit: New Patient        Chief Complaint   Patient presents with    Follow-up     Here  for zio results         HPI / Summary:   Aileen Perea is a 73 y.o. female who presents to re- establish cardiac care,  referred from pulmonary for palpitations   Past medical history significant for ILD,  HLD, scant ASCVD on CT chest preserved LV systolic function,  mild aortic valve regurgitation, mild aortic stenosis       She describes fatigue, tachycardia and palpitations on exertion    No chest pain.   She has tachycardia during pulmonary rehab   An extended Holter showed palpitations / shortness of breath with NSR and ST       12 system review is negative except as noted above       Medical History:   Past Medical History:   Diagnosis Date    Personal history of other diseases of the respiratory system 12/21/2022    History of acute bronchitis       Surgical History:   Past Surgical History:   Procedure Laterality Date    HYSTERECTOMY      OTHER SURGICAL HISTORY  07/19/2021    Back surgery    OTHER SURGICAL HISTORY  07/19/2021    Hysterectomy    OTHER SURGICAL HISTORY  07/19/2021    Knee surgery    OTHER SURGICAL HISTORY  07/19/2021    Dilation and curettage       Family History:   Family History   Problem Relation Name Age of Onset    Breast cancer Maternal Grandmother         Social History:   Tobacco Use: Low Risk  (6/7/2024)    Patient History     Smoking Tobacco Use: Never     Smokeless Tobacco Use: Never     Passive Exposure: Not on file             MEDICATIONS:   Current Outpatient Medications   Medication Instructions    leflunomide (Arava) 10 mg tablet     metoprolol succinate XL (TOPROL-XL) 25 mg, oral, Daily, Do not crush or chew.    PlaqueniL 200 mg, oral, Daily    predniSONE (DELTASONE) 2.5 mg, oral, Daily    rosuvastatin (CRESTOR) 10 mg, oral,  Daily         IMAGING REVIEWED:     Extended Holter 4/25/2024 full report reviewed     Echocardiogram 8/11/2021  CONCLUSIONS:  1. The left ventricular systolic function is normal with a 50-55% estimated ejection fraction.  2. Spectral Doppler shows an impaired relaxation pattern of left ventricular diastolic filling.  3. Mild aortic valve stenosis.  4. There is moderate aortic valve cusp calcification.  5. There is mild aortic valve regurgitation.      CT HEART CALCIUM SCORING 8/11/2021  Impression  1. Coronary artery calcium score of 10.34*.  2. Grossly abnormal lung parenchyma with predominant ground-glass  density bilaterally. No priors are available to determine degree of  acuity. Findings may relate to acute pneumonitis, edema, or  hemorrhage. Finding may also be seen in chronic idiopathic  pneumonias. Clinical correlation needed.      LABS:  CBC:   Lab Results   Component Value Date    WBC 4.6 04/05/2024    RBC 4.65 04/05/2024    HGB 13.7 04/05/2024    HCT 42.9 04/05/2024    MCV 92 04/05/2024    MCH 29.5 04/05/2024    MCHC 31.9 (L) 04/05/2024    RDW 13.1 04/05/2024     04/05/2024    MPV 9.5 10/30/2023     CBC with Differential:    Lab Results   Component Value Date    WBC 4.6 04/05/2024    RBC 4.65 04/05/2024    HGB 13.7 04/05/2024    HCT 42.9 04/05/2024     04/05/2024    MCV 92 04/05/2024    MCH 29.5 04/05/2024    MCHC 31.9 (L) 04/05/2024    RDW 13.1 04/05/2024    NRBC  04/05/2024      Comment:      Not Measured    LYMPHOPCT 29.8 04/05/2024    MONOPCT 10.9 04/05/2024    EOSPCT 5.3 04/05/2024    BASOPCT 0.2 04/05/2024    MONOSABS 0.50 04/05/2024    LYMPHSABS 1.36 04/05/2024    EOSABS 0.24 04/05/2024    BASOSABS 0.01 04/05/2024     CMP:    Lab Results   Component Value Date     10/30/2023    K 3.8 10/30/2023     10/30/2023    CO2 25 10/30/2023    BUN 8 10/30/2023    CREATININE 0.58 10/30/2023    GLUCOSE 79 10/30/2023    PROT 7.5 10/30/2023    CALCIUM 9.2 10/30/2023    BILITOT 0.6  "10/30/2023    ALKPHOS 70 10/30/2023    AST 21 10/30/2023    ALT 16 10/30/2023     BMP:    Lab Results   Component Value Date     10/30/2023    K 3.8 10/30/2023     10/30/2023    CO2 25 10/30/2023    BUN 8 10/30/2023    CREATININE 0.58 10/30/2023    CALCIUM 9.2 10/30/2023    GLUCOSE 79 10/30/2023     Magnesium:  Lab Results   Component Value Date    MG 2.40 10/30/2023     Troponin:    Lab Results   Component Value Date    TROPHS 5 11/02/2022     BNP:   Lab Results   Component Value Date    BNP 38 11/02/2022       Lipid Panel:  Lab Results   Component Value Date    HDL 60.4 11/08/2023    CHHDL 2.5 11/08/2023    VLDL 29 11/08/2023    TRIG 145 11/08/2023    NHDL 94 11/08/2023        Lab work and imaging results independently reviewed by me         Visit Vitals  /87   Pulse 77   Ht 1.473 m (4' 10\")   Wt 51.3 kg (113 lb)   SpO2 99%   BMI 23.62 kg/m²   OB Status Hysterectomy   Smoking Status Never   BSA 1.45 m²          Constitutional:       Appearance: Healthy appearance. Not in distress.   Eyes:      Conjunctiva/sclera: Conjunctivae normal.   Neck:      Vascular: JVD normal.   Pulmonary:      Effort: Pulmonary effort is normal.      Breath sounds: Normal breath sounds.   Cardiovascular:      PMI at left midclavicular line. Normal rate. Regular rhythm. Normal S1. Normal S2.       Murmurs: There is no murmur.      No rub.   Pulses:     Intact distal pulses.   Edema:     Peripheral edema absent.   Abdominal:      General: Bowel sounds are normal.   Musculoskeletal:      Cervical back: Neck supple. Skin:     General: Skin is warm and dry.   Neurological:      Mental Status: Alert and oriented to person, place and time.             Problem List Items Addressed This Visit             ICD-10-CM    ASCVD (arteriosclerotic cardiovascular disease) I25.10    Nonrheumatic aortic valve stenosis - Primary I35.0    Primary hypertension I10    Palpitations R00.2         Echocardiogram for surveillance of aortic " stenosis and structural disease   Add lisinopril 5 mg daily   I will call with the echo results and further instructions         06/07/24 at 9:31 AM - ALEXANDRO Tobar-CNP        Followup Appts:  Future Appointments   Date Time Provider Department Center   6/10/2024  9:00 AM CARDIAC REHAB GEN 1F CARDREHAB ROOM [address]   6/12/2024  9:00 AM CARDIAC REHAB GEN 1F CARDREHAB ROOM [address]   6/17/2024  8:30 AM CARDIAC REHAB GEN 1F CARDREHAB ROOM [address]   6/19/2024  8:30 AM CARDIAC REHAB GEN 1F CARDREHAB ROOM [address]   6/24/2024  8:30 AM CARDIAC REHAB GEN 1F CARDREHAB ROOM [address]   6/26/2024  8:30 AM CARDIAC REHAB GEN 1F CARDREHAB ROOM [address]   7/1/2024  8:30 AM CARDIAC REHAB GEN 1F CARDREHAB ROOM [address]   7/3/2024  8:30 AM CARDIAC REHAB GEN 1F CARDREHAB ROOM [address]   7/8/2024  8:30 AM CARDIAC REHAB GEN 1F CARDREHAB ROOM [address]   7/10/2024  8:30 AM CARDIAC REHAB GEN 1F CARDREHAB ROOM [address]   7/15/2024  8:30 AM CARDIAC REHAB GEN 1F CARDREHAB ROOM [address]   7/17/2024  8:30 AM CARDIAC REHAB GEN 1F CARDREHAB ROOM [address]   7/22/2024  8:30 AM CARDIAC REHAB GEN 1F CARDREHAB ROOM [address]   7/24/2024  8:30 AM CARDIAC REHAB GEN 1F CARDREHAB ROOM [address]   7/29/2024  8:30 AM CARDIAC REHAB GEN 1F CARDREHAB ROOM [address]   7/31/2024  8:30 AM CARDIAC REHAB GEN 1F CARDREHAB ROOM [address]   8/5/2024  9:15 AM GEN RESPTHER1 PFT ROOM [address]   8/5/2024  9:30 AM GEN RESPTHER1 PFT ROOM [address]   8/5/2024 10:30 AM GEN RESPTHER1 PFT ROOM [address]   8/5/2024 12:30 PM CARDIAC REHAB GEN 1F CARDREHAB ROOM [address]   8/6/2024 10:30 AM Orestes Mccarthy MD [id number] [facility]   8/7/2024  8:30 AM CARDIAC REHAB GEN 1F CARDREHAB ROOM [address]   8/12/2024  8:30 AM CARDIAC REHAB GEN 1F CARDREHAB ROOM [address]   8/14/2024  8:30 AM CARDIAC REHAB GEN 1F CARDREHAB ROOM [address]   8/19/2024  8:30 AM CARDIAC REHAB  GEN 1F CARDREHAB ROOM [address]   8/21/2024  8:30 AM CARDIAC REHAB GEN 1F CARDREHAB ROOM [address]   8/26/2024  8:30 AM CARDIAC REHAB GEN 1F CARDREHAB ROOM [address]   8/28/2024  8:30 AM CARDIAC REHAB GEN 1F CARDREHAB ROOM [address]   9/4/2024  8:30 AM CARDIAC REHAB GEN 1F CARDREHAB ROOM [address]   9/9/2024  8:30 AM CARDIAC REHAB GEN 1F CARDREHAB ROOM [address]   9/11/2024  8:30 AM CARDIAC REHAB GEN 1F CARDREHAB ROOM [address]   9/16/2024  8:30 AM CARDIAC REHAB GEN 1F CARDREHAB ROOM [address]   9/18/2024  8:30 AM CARDIAC REHAB GEN 1F CARDREHAB ROOM [address]

## 2024-06-07 ENCOUNTER — OFFICE VISIT (OUTPATIENT)
Dept: CARDIOLOGY | Facility: CLINIC | Age: 74
End: 2024-06-07
Payer: MEDICARE

## 2024-06-07 VITALS
HEIGHT: 58 IN | BODY MASS INDEX: 23.72 KG/M2 | OXYGEN SATURATION: 99 % | HEART RATE: 77 BPM | SYSTOLIC BLOOD PRESSURE: 153 MMHG | DIASTOLIC BLOOD PRESSURE: 87 MMHG | WEIGHT: 113 LBS

## 2024-06-07 DIAGNOSIS — J84.9 INTERSTITIAL LUNG DISEASE (MULTI): ICD-10-CM

## 2024-06-07 DIAGNOSIS — I35.0 NONRHEUMATIC AORTIC VALVE STENOSIS: Primary | ICD-10-CM

## 2024-06-07 DIAGNOSIS — R00.2 PALPITATIONS: ICD-10-CM

## 2024-06-07 DIAGNOSIS — I25.10 ASCVD (ARTERIOSCLEROTIC CARDIOVASCULAR DISEASE): ICD-10-CM

## 2024-06-07 DIAGNOSIS — I10 PRIMARY HYPERTENSION: ICD-10-CM

## 2024-06-07 PROCEDURE — 1159F MED LIST DOCD IN RCRD: CPT | Performed by: NURSE PRACTITIONER

## 2024-06-07 PROCEDURE — 1160F RVW MEDS BY RX/DR IN RCRD: CPT | Performed by: NURSE PRACTITIONER

## 2024-06-07 PROCEDURE — 3077F SYST BP >= 140 MM HG: CPT | Performed by: NURSE PRACTITIONER

## 2024-06-07 PROCEDURE — 99214 OFFICE O/P EST MOD 30 MIN: CPT | Performed by: NURSE PRACTITIONER

## 2024-06-07 PROCEDURE — 1036F TOBACCO NON-USER: CPT | Performed by: NURSE PRACTITIONER

## 2024-06-07 PROCEDURE — 3079F DIAST BP 80-89 MM HG: CPT | Performed by: NURSE PRACTITIONER

## 2024-06-07 RX ORDER — LISINOPRIL 5 MG/1
5 TABLET ORAL DAILY
Qty: 30 TABLET | Refills: 11 | Status: SHIPPED | OUTPATIENT
Start: 2024-06-07 | End: 2025-06-07

## 2024-06-10 ENCOUNTER — CLINICAL SUPPORT (OUTPATIENT)
Dept: CARDIAC REHAB | Facility: HOSPITAL | Age: 74
End: 2024-06-10
Payer: MEDICARE

## 2024-06-10 DIAGNOSIS — J84.9 ILD (INTERSTITIAL LUNG DISEASE) (MULTI): Primary | ICD-10-CM

## 2024-06-10 PROCEDURE — G0239 OTH RESP PROC, GROUP: HCPCS | Performed by: PEDIATRICS

## 2024-06-12 ENCOUNTER — CLINICAL SUPPORT (OUTPATIENT)
Dept: CARDIAC REHAB | Facility: HOSPITAL | Age: 74
End: 2024-06-12
Payer: MEDICARE

## 2024-06-12 DIAGNOSIS — J84.9 ILD (INTERSTITIAL LUNG DISEASE) (MULTI): Primary | ICD-10-CM

## 2024-06-12 PROCEDURE — G0239 OTH RESP PROC, GROUP: HCPCS | Performed by: PEDIATRICS

## 2024-06-12 NOTE — PROGRESS NOTES
"Pulmonary Daily Rehabilitation Assessment    Name: Aileen Perea  Medical Record Number: [id number]  YOB: 1950    Today’s Date: 6/12/2024  Primary Care Physician: ALEXANDRO Noyola-CUCO    Session #: 12    Oxygen Assessment    SP02 rest: 100 %  SP02 exertion: 96 %    Oxygen Use  Requires supplemental O2:    Liters at Rest: NA    Liters with Exertion: NA  Using O2 as prescribed: NA      Weight Management    Height: 4'10\"  Weight: 115  BMI: 23.9       Daily Activity Log   Modality METS Load Duration   1 Pre-Exercise                   D 2/E 0      2 Treadmill                           9/1 2.3 1.7 15:00   3 Nustep 4000                     9/2 2.5-2.8 5.0 31.41   4 Recumbent Cycle       5 Weights       6 UBE      7 Rower      8 Aero Bike      9 Cardio-fit      10 Post Exercise                0.5/0            Other Core Components/Risk Factor Assessment:    Blood Pressure Management:  Hx of Hypertension: Yes  Resting BP: 160/77  POST BP: 144/75      General Comments:  This is patients last class-opting to only do 4 weeks. Could have benefited from 12 weeks as patient did well with exercises today but could have continued to increase endurance and stamina by increasing workload.  Patient using proper breathing techniques (pursed lip and diaphragmatic breathing) while exerting herself.          Rehab Staff Signature: Adrianne Simon RRT  "

## 2024-06-12 NOTE — PROGRESS NOTES
"Pulmonary Daily Rehabilitation Assessment    Name: Aileen Perea  Medical Record Number: [id number]  YOB: 1950    Today’s Date: 6/12/2024  Primary Care Physician: ALEXANDRO Noyola-CUCO    Session #: 12    Oxygen Assessment    SP02 rest: 100 %  SP02 exertion: 91 %    Oxygen Use  Requires supplemental O2:    Liters at Rest: NA   Liters with Exertion: NA  Using O2 as prescribed: NA      Weight Management    Height: 4'10\"  Weight: 145.5  BMI: 23.9       Daily Activity Log   Modality METS Load Duration   1 Pre-Exercise      2 Treadmill                        9/2 2.3 1.7 15:00   3 Nustep 4000                  9/2 2.6-2.8 5 17:00   4 Recumbent Cycle  (Sports Art )  11/3 21-23 3.5 12:00   5 Weights       6 UBE      7 Rower      8 Aero Bike      9 Cardio-fit      10 Post-Exercise            Other Core Components/Risk Factor Assessment:    Blood Pressure Management:  Hx of Hypertension: YES  Resting BP: 138/83  POST BP: 142/76      General Comments:  Patient did very well exercising.  She appear more Sob than she states but knows how to controll her breathing.  Education class was on breathing retraining and breathing techniques.      Rehab Staff Signature: Adrianne Simon RRT  "

## 2024-06-17 ENCOUNTER — APPOINTMENT (OUTPATIENT)
Dept: CARDIAC REHAB | Facility: HOSPITAL | Age: 74
End: 2024-06-17
Payer: MEDICARE

## 2024-06-19 ENCOUNTER — APPOINTMENT (OUTPATIENT)
Dept: CARDIAC REHAB | Facility: HOSPITAL | Age: 74
End: 2024-06-19
Payer: MEDICARE

## 2024-06-21 ENCOUNTER — HOSPITAL ENCOUNTER (OUTPATIENT)
Dept: CARDIOLOGY | Facility: HOSPITAL | Age: 74
Discharge: HOME | End: 2024-06-21
Payer: MEDICARE

## 2024-06-21 DIAGNOSIS — R00.2 PALPITATIONS: ICD-10-CM

## 2024-06-21 DIAGNOSIS — I35.0 NONRHEUMATIC AORTIC VALVE STENOSIS: ICD-10-CM

## 2024-06-21 DIAGNOSIS — R94.31 ABNORMAL ELECTROCARDIOGRAM (ECG) (EKG): ICD-10-CM

## 2024-06-21 DIAGNOSIS — J84.9 INTERSTITIAL LUNG DISEASE (MULTI): ICD-10-CM

## 2024-06-21 LAB
AORTIC VALVE MEAN GRADIENT: 9 MMHG
AORTIC VALVE PEAK VELOCITY: 2.15 M/S
AV PEAK GRADIENT: 18.5 MMHG
AVA (PEAK VEL): 1.12 CM2
AVA (VTI): 1.59 CM2
EJECTION FRACTION APICAL 4 CHAMBER: 54.2
EJECTION FRACTION: 58 %
LEFT ATRIUM VOLUME AREA LENGTH INDEX BSA: 21.2 ML/M2
LEFT VENTRICLE INTERNAL DIMENSION DIASTOLE: 3.48 CM (ref 3.5–6)
LEFT VENTRICULAR OUTFLOW TRACT DIAMETER: 1.9 CM
MITRAL VALVE E/A RATIO: 0.59
MITRAL VALVE E/E' RATIO: 9.43
RIGHT VENTRICLE FREE WALL PEAK S': 15.8 CM/S
TRICUSPID ANNULAR PLANE SYSTOLIC EXCURSION: 2 CM

## 2024-06-21 PROCEDURE — 93306 TTE W/DOPPLER COMPLETE: CPT | Performed by: INTERNAL MEDICINE

## 2024-06-21 PROCEDURE — 93306 TTE W/DOPPLER COMPLETE: CPT

## 2024-06-24 ENCOUNTER — APPOINTMENT (OUTPATIENT)
Dept: CARDIAC REHAB | Facility: HOSPITAL | Age: 74
End: 2024-06-24
Payer: MEDICARE

## 2024-06-26 ENCOUNTER — APPOINTMENT (OUTPATIENT)
Dept: CARDIAC REHAB | Facility: HOSPITAL | Age: 74
End: 2024-06-26
Payer: MEDICARE

## 2024-07-09 RX ORDER — HYDROXYCHLOROQUINE SULFATE 200 MG/1
200 TABLET, FILM COATED ORAL DAILY
Qty: 30 TABLET | Refills: 2 | OUTPATIENT
Start: 2024-07-09

## 2024-07-25 NOTE — PROGRESS NOTES
Pulmonary Rehabilitation Assessment    Name: Aileen Perea  Medical Record Number: [id number]  YOB: 1950    Today’s Date: 7/25/2024  Primary Care Physician: DIAMOND Noyola  Referring Physician: Stephanie Cameron CNP, Dr Causey    Program Location: [facility]    General  Primary Diagnosis: ILD  Date of Diagnosis: 2020    Session #: 12    Oxygen Assessment    SP02 rest: 100 % RA  SP02 exertion: 91 %RA    Oxygen Use  Requires supplemental O2:    Liters at Rest: RA   Liters with Exertion: RA  Using O2 as prescribed:  NA      Oxygen (Supplemental 02 use only)  Demonstrates appropriate knowledge of 02 use? NA  Demonstrates knowledge of 02 safety? NA  Home 02 system: NA  Portable 02: NA    Hypoxemia managed?:  NA    Home Pulse Oximeter?: Yes      MMRC Survey score:  Intake: 1  Discharge: 1    Goal Status:  NA  Oxygen Goals: Add or titrate o2 as needed to keep spo2 @88% or greater.  Use proper breathing techniques as needed to ensure sufficient o2 saturation during exercise.  Oxygen Interventions:Check Spo2 throughout exercise with POX, teach and reinforce pursed lip breathing and diaphragmatic breathing      Psychosocial Assessment    Pt reported/currently experiencing: No  Currently seeing a mental health provider: No  Social Support: Yes, Whom:  PHQ-9 Survey Score:   Intake: 5  Discharge: 1    CAT Survey Score:  Intake:8  Discharge:7    Learning assessment/barriers:  Only did 4 weeks  Preferred learning method: Auditory and Reading handout     Educational Assessment:  Pulmonary Knowledge Test:   Intake: 12/15  Discharge: 13/15    Stages of Change: Maintenance    Goal Status: Met  Psychosocial Goals: Maintain and/or improve current psychosocial status.  Psychosocial Interventions/Education: Becks score discussed-did not participate in this particular ed. class    Nutrition Assessment:    Current Dietary Guidelines: NA  Barriers to dietary change:  NA    Diet Habit Survey:  "drop down selection of Picture Your Plate, NA   Plate: NA   Intake: NA  Discharge: NA    Diabetes Assessment    Diabetes:No   Medication:  None  Last Hemoglobin A1C: 5.4    Last Hemoglobin A1C date: 10/30/23  Pt monitors BS at home: No   Frequency: NA  FBS range: NA  Hypoglycemic Episodes: No     Weight Management    Height: 4'10\"  Weight: 115  BMI: 24   Goal Status:  No goal or concerns  Nutrition Goals: Follow up with dietitian and/or enrolled in weight management program if needed.  Nutrition Interventions/Education: Give support material as necessary.  Weight measurement at every session.    Exercise Assessment  Current Home Exercise:  current tries for 10,000 steps a day  Mode: walking  Days/Week: daily  Min/Day: 60    6-minute Walk Assessment:  Intake: 6-MWT distance (meters):446.25     FiO2:RA     SPO2:95%  Discharge: 6-MWT distance (meters): 505.65     FiO2:RA     SPO2:96%    Exercise Prescription:    Based on: { [facility] and 6MWT   Frequency:  3 days/week   Mode: Treadmill, NuStep, Arm Ergometer, SciFit, and weights and band   Duration: 37-55 total aerobic minutes   Intensity: RPE 12       Target .5       Max METS 8.3       SpO2 Range 89 to 100 %       O2 Flow Rate 21 to 6 L/min     Modality METS Load Duration   1 Pre-Exercise NA NA NA   2 Treadmill 2/9 2.3 1.7 20:00   3 Nustep 4000 2/9 2.7-2.8 5.0 25:00   4 Recumbent Cycle 2/9 12-19 3.5 12:00   5 Weights 2/9 - 2 lbs 12:00   6 UBE  41-57 rpm 0-5 W 10:00     Resistance Training: Yes   Home Exercise Prescription given: Yes     Goal Status: Met  Exercise Goals: aerobic exercise 4-6 days/wk and at least 150 min per wk.  Improve 6 mwt distance by at least 30 Meters.  Exercise Interventions/Education: Increase duration by 2-4 min weekly up to 40 min of aerobic exercise as tolerated.    Other Core Components/Risk Factor Assessment:    Medication adherence:  Current Medications:    Current Outpatient Medications   Medication Sig Dispense Refill    leflunomide " (Arava) 10 mg tablet       lisinopril 5 mg tablet Take 1 tablet (5 mg) by mouth once daily. 30 tablet 11    PlaqueniL 200 mg tablet Take 1 tablet (200 mg) by mouth once daily.      predniSONE (Deltasone) 5 mg tablet Take 0.5 tablets (2.5 mg) by mouth once daily. 30 tablet 5    rosuvastatin (Crestor) 10 mg tablet TAKE 1 TABLET BY MOUTH EVERY DAY 90 tablet 1     No current facility-administered medications for this visit.     Allergies:    Allergies   Allergen Reactions    Meperidine Unknown       Taking medications as prescribed: Yes    If no, why: NA   Uses pill box/organizer: No    Carries medication list: No     Blood Pressure Management:  Hx of Hypertension: Yes   Resting BP: 160/77    Smoking/Tobacco Assessment;    Social History     Tobacco Use   Smoking Status Never   Smokeless Tobacco Never       Goal Status: Met  Other Core Component Goals: Learn to use controlled coughing techniques-Class 06/03/24  Other Core Component Interventions/Education: Check resting BP pre/post exercise at every session     # of hospital admissions due to lung problems: 0  # of ER visits due to lung problems: 0    Individual Patient Goals:  Learn to handle SOB  Breathing techniques  Increase stamina and endurance  General Comments:  Patient wanted only to participate for 4 weeks.      Rehab Staff Signature: Adrianne Simon, RRT

## 2024-07-26 ENCOUNTER — TELEPHONE (OUTPATIENT)
Dept: ORTHOPEDIC SURGERY | Age: 74
End: 2024-07-26

## 2024-07-30 ENCOUNTER — OFFICE VISIT (OUTPATIENT)
Dept: ORTHOPEDIC SURGERY | Age: 74
End: 2024-07-30
Payer: MEDICARE

## 2024-07-30 VITALS — WEIGHT: 110 LBS | TEMPERATURE: 98 F | HEIGHT: 58 IN | BODY MASS INDEX: 23.09 KG/M2

## 2024-07-30 DIAGNOSIS — M17.12 PRIMARY OSTEOARTHRITIS OF LEFT KNEE: Primary | ICD-10-CM

## 2024-07-30 PROCEDURE — 99213 OFFICE O/P EST LOW 20 MIN: CPT | Performed by: ORTHOPAEDIC SURGERY

## 2024-07-30 PROCEDURE — 20610 DRAIN/INJ JOINT/BURSA W/O US: CPT | Performed by: ORTHOPAEDIC SURGERY

## 2024-07-30 PROCEDURE — 1036F TOBACCO NON-USER: CPT | Performed by: ORTHOPAEDIC SURGERY

## 2024-07-30 PROCEDURE — G8420 CALC BMI NORM PARAMETERS: HCPCS | Performed by: ORTHOPAEDIC SURGERY

## 2024-07-30 PROCEDURE — G8427 DOCREV CUR MEDS BY ELIG CLIN: HCPCS | Performed by: ORTHOPAEDIC SURGERY

## 2024-07-30 PROCEDURE — 3017F COLORECTAL CA SCREEN DOC REV: CPT | Performed by: ORTHOPAEDIC SURGERY

## 2024-07-30 PROCEDURE — G8399 PT W/DXA RESULTS DOCUMENT: HCPCS | Performed by: ORTHOPAEDIC SURGERY

## 2024-07-30 PROCEDURE — 1090F PRES/ABSN URINE INCON ASSESS: CPT | Performed by: ORTHOPAEDIC SURGERY

## 2024-07-30 PROCEDURE — 1123F ACP DISCUSS/DSCN MKR DOCD: CPT | Performed by: ORTHOPAEDIC SURGERY

## 2024-07-30 NOTE — PROGRESS NOTES
intact to all sensory roots,.    Cardiovascular:  The vascular exam is normal and is well perfused to distal extremities.  Distal pulses DP/PT: R. 2+; L. 2+.  There is cap refill noted less than two seconds in all digits. There is not edema of the bilateral lower extremities.  There is not varicosities noted in the distal extremities.      Lymph:  Upon palpation,  there is no lymphadenopathy noted in bilateral lower extremities.      Musculoskeletal:  Gait: antalgic; examination of the nails and digits reveal no cyanosis or clubbing.    Lumbar exam:  On visual inspection, there is not deformity of the spine.   full range of motion, no tenderness, palpable spasm or pain on motion. Special tests: Straight Leg Raise negative, Arturo test negative.      Hip exam:   Upon inspection, there is not deformity noted.  Upon palpation there is not tenderness.  ROM: is  full and symmetrical.   Strength: Hip Flexors 5/5; Hip Abductors 5/5; Hip Adduction 5/5.     Knee exam:  Left knee exam shows;  range of motion of R. Knee is 0 to 120, and L. Knee is 0 to 120. The patient does have  pain on motion, effusion is mild, there is tenderness over the  medial, lateral, anterior region, there are not any masses, there is not ligamentous instability, there is  deformity noted.    Knee exam: left positive for moderate crepitations, some mild tenderness laxity is not noted with stress.  There is not a popliteal cyst.    R. Knee:  Lachman's negative, Anterior Drawer negative, Posterior Drawer negative  Adriel's negative, Thallasy  negative,   PF grind test negative, Apprehension test negative, Patellar J sign  negative  L. Knee:  Lachman's negative, Anterior Drawer negative, Posterior Drawer negative  Adriel's negative, Thallasy  negative,   PF grind test negative, Apprehension test negative,  Patellar J sign  negative    Ankle Exam:    Upon inspection and palpation of the Left ankle,  there is  deformity noted,  mild swelling, mild

## 2024-08-05 ENCOUNTER — HOSPITAL ENCOUNTER (OUTPATIENT)
Dept: RESPIRATORY THERAPY | Facility: HOSPITAL | Age: 74
Discharge: HOME | End: 2024-08-05
Payer: MEDICARE

## 2024-08-05 DIAGNOSIS — J84.9 INTERSTITIAL PULMONARY DISEASE, UNSPECIFIED (MULTI): ICD-10-CM

## 2024-08-05 DIAGNOSIS — Z79.52 LONG TERM (CURRENT) USE OF SYSTEMIC STEROIDS: ICD-10-CM

## 2024-08-05 LAB
MGC ASCENT PFT - FEV1 - POST: 1.24
MGC ASCENT PFT - FEV1 - POST: 1.24
MGC ASCENT PFT - FEV1 - PRE: 1.06
MGC ASCENT PFT - FEV1 - PRE: 1.06
MGC ASCENT PFT - FEV1 - PREDICTED: 1.63
MGC ASCENT PFT - FEV1 - PREDICTED: 1.63
MGC ASCENT PFT - FVC - POST: 1.4
MGC ASCENT PFT - FVC - POST: 1.4
MGC ASCENT PFT - FVC - PRE: 1.31
MGC ASCENT PFT - FVC - PRE: 1.31
MGC ASCENT PFT - FVC - PREDICTED: 2.07
MGC ASCENT PFT - FVC - PREDICTED: 2.07

## 2024-08-05 PROCEDURE — 94729 DIFFUSING CAPACITY: CPT

## 2024-08-05 PROCEDURE — 94060 EVALUATION OF WHEEZING: CPT | Performed by: PEDIATRICS

## 2024-08-05 PROCEDURE — 94664 DEMO&/EVAL PT USE INHALER: CPT | Mod: 59

## 2024-08-05 PROCEDURE — 98960 EDU&TRN PT SELF-MGMT NQHP 1: CPT

## 2024-08-05 PROCEDURE — 94618 PULMONARY STRESS TESTING: CPT | Performed by: PEDIATRICS

## 2024-08-05 PROCEDURE — 94060 EVALUATION OF WHEEZING: CPT

## 2024-08-05 PROCEDURE — 94729 DIFFUSING CAPACITY: CPT | Performed by: PEDIATRICS

## 2024-08-05 PROCEDURE — 94618 PULMONARY STRESS TESTING: CPT

## 2024-08-06 ENCOUNTER — APPOINTMENT (OUTPATIENT)
Dept: PULMONOLOGY | Facility: CLINIC | Age: 74
End: 2024-08-06
Payer: MEDICARE

## 2024-08-06 ENCOUNTER — TELEMEDICINE (OUTPATIENT)
Dept: PULMONOLOGY | Facility: CLINIC | Age: 74
End: 2024-08-06
Payer: MEDICARE

## 2024-08-06 DIAGNOSIS — J84.9 ILD (INTERSTITIAL LUNG DISEASE) (MULTI): Primary | ICD-10-CM

## 2024-08-06 PROCEDURE — 99214 OFFICE O/P EST MOD 30 MIN: CPT | Performed by: INTERNAL MEDICINE

## 2024-08-06 NOTE — PATIENT INSTRUCTIONS
Mrs Perea, it was a pleasure seeing you in clinic today. We discussed the following:     -Your breathing test is slightly decreased but your walking test is improved   -You can continue your prednisone at 2.5 mg daily     Will see you back in clinic in 3-4 months with repeat HRCT, repeat breathing test and walking test     Call [phone] to schedule a breathing or a walking test     Call [phone] to schedule  EKG's, Echocardiograms and Cardiopulmonary Stress Tests.    Call [phone] to schedule Radiology tests such as Nuclear Medicine Stress Tests, CT Scans, and MRI's.    Should you have any questions Please Call our pulmonary nurse Lilia Quijano at [phone]  or my assistant Jeff at [phone]

## 2024-08-06 NOTE — PROGRESS NOTES
History of Present Illness  Mrs. Perea is a 73 y.o.  woman never a smoker follows up in pulmonary clinic for ILD, most likely organizing pneumonia     PCP: Dr. Damon     HPI:  New patient here today to establish care. Patient had a CT cardiac scoring which did show bilateral groundglass densities. She also had a chest x-ray which showed the same. Patient was not feeling well at the time she was treated with antibiotics and prednisone and tells me she greatly improved. She still has residual shortness of breath and coughing. This is new patient has always been very healthy. She states she has been short of breath since her sinus infection she had last December. She was tested for Covid in December it was negative has not been tested since although her chest CT does look like it could have been Covid. She still has some rhonchi in her bilateral lower lobes.i am going to order her another round of antibiotics and prednisone and repeat a chest CT and also order her a PFT.     11/17/2021: Ms Perea feels better after prednisone and azithromycin. She is able to walk on flat surfaces without issue but walking up hill is still a struggle for her. She had a repeat CT done which shows mosaic attenuation which is unchanged. She also had a PFT done which shows moderately severe restriction with severe reduction in diffusing capacity.     12/16/2021: Ms Perea is doing ok breathing is fine. I reviewed the rheumatologic labs with her (all are normal)     02/15/2022: Since the last visit, patient's breathing has been improving. No new chest pain, cough, sputum, fever, chills or night sweats. She is walking 35155 steps daily without limitations.     05/10/2022: Since the last visit, patient's breathing has been worsening. Continues to cough and has reduced exercise capacity. Had bronchoscopy with biopsy done (results below).      06/14/2022 since her last visit she feels that her breathing is greatly improved. She has  been taking 40 mg of prednisone with great improvement in her symptoms. She continues to have an occasional dry cough. No significant side effects of prednisone outside of some mood changes and increased energy. She had repeat high-resolution CT scan done as well as a breathing test and a 6-minute walk test (results below).     08/30/2022: Since the last visit, patient's breathing has been improving. CAT 3. Patient had repeat breathing test, done (results below). She is down to 20mg of prednisone. She gained about 4-5 pounds.      11/1/2022: Complains mostly of neck pain. Since the last visit, patient's breathing is mostly unchanged. Stopped the prednisone on 10/07/2022. No new chest pain, cough, sputum, fever, chills or night sweats. Patient had repeat breathing test, 6MWT, CT scan done (results below).      01/10/2023: Going to PT for shoulder and back pain. Feels her breathing is mostly unchagned. Coughs occasionally, uses her nasal sprays. No new chest pain, cough, sputum, fever, chills or night sweats. She was in the emergency department on 11/02/2022, a CTPE was done and was negative for PE. Was seen again in the ED on 12/02, CXR looked slightly worse, she completed a course of levaquin and Medrol pack. Partially improved today.     02/07/2023: Since the last visit, patient's breathing is mostly unchanged. No new chest pain, cough, sputum, fever, chills or night sweats. Complains mostly of joint pain. Had repeat CT done (results below).      03/07/2023: Since the last visit, patient's breathing has been improving. She was saw a rheumatologist and had ?lab work, and hw was started on Plaquenil. 02/16 she started the Plaquenil. Since the last visit, patient's breathing has been improving. CAT of 2.      06/06/2023: Since the last visit, patient's breathing is mostly unchanged. No new chest pain, cough, sputum, fever, chills or night sweats. Rheumatology increased her palquenil to 400mg. Had a fall and hurt her  left knee. Patient had repeat breathing test, 6MWT, CT scan done (results below).      08/29/2023: She is now up to 400mg for plaquenil and rheum added leflunomide. Since the last visit, patient's breathing is mostly unchanged. No new chest pain, cough, sputum, fever, chills or night sweats. Patient had repeat CT scan done (results below). she is still taking 5 mg of prednisone    12/5/2023: Since the last visit, patient's breathing is better. No hospital admissions or ED visits. No new chest pain, cough, sputum, fever, chills or night sweats. Patient active in everyday life goes on walks, climbs stairs, does participates in regimented exercise. Had sinus surgery in August which she thinks helped. Patient had repeat breathing test, 6MWT, CT scan done (results below). Patient compliant with prescribed inhalers using daily plaquenil 200mg once daily and leflulonimde 10mg daily and prednisone 2.5mg.    4/23/2024:  Since the last visit, patient's breathing is mostly unchanged. No hospital admissions or ED visits. No new chest pain, cough, sputum, fever, chills or night sweats. About 5 weeks ago she had knee swelling in her knee and he pred was increased to 20mg for 5 days and was decreased back to her home dose to 2.5mg. She is experiencing palpitations. She was prescribed metoprolol by ED attending. Patient had repeat breathing test, 6MWT (results below).      8/6/2024: Since the last visit, patient's breathing is mostly unchanged. Had another injection for her knee. Patient active in everyday life goes on walks, climbs stairs, does 27358 steps a day. No hospital admissions or ED visits. No new chest pain, cough, sputum, fever, chills or night sweats. She is on the 2.5mg of prednisone. She is also on the plaquenil and the leflunomide for her RA.  She saw cardiology for palpitations and was found to have NSR with ST. Patient had repeat breathing test, 6MWT (results below).     Previous pulmonary history:   She has no  history of recurrent infections, or lung disease as a child. She had no previous lung hx, never on oxygen or inhaler therapy. She currently is on no supplemental oxygen.      Inhalers/nebulized medications: none     Hospitalization History:  She has not been hospitalized over the last year for breathing related problem.     Sleep history:  Denies snoring, apneas, feeling tired during the day or taking naps during the day. Does not feel tired during the day or take frequent naps.  STOP-BANG score of 2     Comorbidities:   ASCVD  anemia     SH:  smoking: never but second hand smoke from  of 50 years  drinking: none  illicit drug use: none     Occupation: (Full questionnaire on exposures obtained, discussed with the patient and scanned to EMR)  worked as RN  No known exposure to asbestos, silica or beryllium     Family History:  No family history of lung diseases or cancer     Imaging history: (I have personally reviewed the imaging below)  07/24/2023 HRCT with great improvement in inflammation and minimal residual   01/12/2023 HRCT with slight progression of GGO  10/24/2022 HRCT with great improvement but residual GGO in the mediastinal area with some progression of traction bronchiectasis  06/08/2022 (compared to 04/2022) HRCT with great improvement in GGO opacities with some residual  8/11/2021 space a CT cardiac scoring shows a groundglass density bilaterally     PFTs:  08/05/2024 -> Ratio of 0.88/FEV1 1.24L (75%) (no BD response)/FVC 1.4L (67%) /DLCO 65 -> 114%  04/15/2024 -> Ratio of 0.86/FEV1 1.10L (67%)(no BD response)/FVC 1.28L (61%)/TLC 50%/RVtoTLC ratio 0.53/DLCO 59%  05/30/2023 -> Ratio of 0.86/FEV1 1.23L (74%)(no BD response)/FVC 1.43L (64%)/TLC 54%/RVtoTLC ratio 0.36/DLCO 32%  10/24/2022 -> Ratio of 0.89/FEV1 1.25L (74%)(no BD response)/FVC 1.4L (62%)/DLCO 32 -> 52%  08/23/2022: -> Ratio of 0.9/FEV1 1.37L (81%) (no BD response)/FVC 1.53L (68%)/DLCO 37->60%  06/08/2022 -> FEV1/FVC ratio 0.89/FEV1  1.31L (77%) (no BD response)/FVC 1.48L (65%)     6 MWTs:  08/05/2024 ->on RA, 427m. Peak SpO2 of 99%. Reginald SpO2 96%.   04/15/2024 ->on RA, 446m. Peak SpO2 of 100%. Reginald SpO2 95%.   05/30/2023 ->on RA, 385m. Peak SpO2 of 100%. Reginald SpO2 90%.   10/24/2022 ->on RA, 448m. Peak SpO2 of 99%. Reginald SpO2 93%.   06/08/2022 ->on RA, 455m. Peak SpO2 of 100%. Reginald SpO2 93%.      Lung biopsy:   04/20/2022: BAL done but no differential or CD4/CD8 ratio reported. Lymph node biopsy with non-caseating granulomas. TBBx with organizing PNA.      Echo:   8/2021-> Normal EF 50-55%, mild aortic valve stenosis, moderate aortic valve cusp calcification, mild aortic valve regurgitation     Labs:  : Hb 15.3 , Eos <250, Bicarb 26, Creat 053        Review of Systems:   Pertinent positives and review of systems as noted above.  Remaining 10 review of systems is negative or noncontributory to today's episode of care.     Past Medical History:   Diagnosis Date    Personal history of other diseases of the respiratory system 12/21/2022    History of acute bronchitis        Medication Documentation Review Audit       Reviewed by DIAMOND Tobar (Nurse Practitioner) on 06/07/24 at 0937      Medication Order Taking? Sig Documenting Provider Last Dose Status   leflunomide (Arava) 10 mg tablet [id number] Yes  Historical Provider, MD Taking Active     Discontinued 06/07/24 0933   PlaqueniL 200 mg tablet [id number] Yes Take 1 tablet (200 mg) by mouth once daily. Historical Provider, MD Taking Active   predniSONE (Deltasone) 5 mg tablet [id number] Yes Take 0.5 tablets (2.5 mg) by mouth once daily. Orestes Mccarthy MD Taking Active   rosuvastatin (Crestor) 10 mg tablet [id number]  TAKE 1 TABLET BY MOUTH EVERY DAY DIAMOND Noyola  Active                 Physical Exam  Constitutional: Alert and oriented. In no acute distress. Well developed, well nourished.  Head and Face: Normal.   Oropharynx: normal.  Neck: No neck mass observed.  Pulmonary:  Chest is normal to inspection. No increased work of breathing or signs of respiratory distress.   CV: No obvious peripheral edema.  MSK: normal gait and station. No clubbing or cyanosis of the fingernails.  Skin: Normal skin color and pigmentation, normal skin turgor, and no rash.  Neurologic: EOMI. Moving all 4 extremities.  Psychiatric: Intact judgement and insight.     Provider Impressions       # Palpitation 4/23/2024:   he saw cardiology for palpitations and was found to have NSR with ST.     # Ground glass densities on CT consistent with ILD PFT restrictive  - Rheumatologic labs normal HP panel normal  04/20/2022: BAL done without infection. Lymph node biopsy with non-caseating granulomas. TBBx with organizing PNA.   -tapered off prednisone on October 7th 2022  (started on 05/14/2022 at 40mg)  She was in the emergency department on 11/02/2022, a CTPE was done and was negative for PE. Was seen again in the ED on 12/02, CXR looked slightly worse, she completed a course of levaquin and Medrol pack. Partially improved today   -Given CT progression will restart prednisone (02/07/2023) at 20mg and taper by 5mg every 2 weeks.  -will stay on 5mg until next CT     Discussed at ILD multidisciplinary meeting 5/12/22   - MDD Consensus Diagnosis: Unclear - concern for alveolar sarcoid vs lymphoproliferative process.    - Recommendation and Management Plan: Recommend biopsy vs treatment.      # Chronic inflammatory joint pain:  -serologies done and negative in Nov 2021  -Saw rheumatology. Labs Pending. Started on Plaquenil     # Chronic sinusitis and deviated septum:  -Had surgery in August with Dr. Bustos from ENT       f/u in 3-4 months

## 2024-08-13 ENCOUNTER — APPOINTMENT (OUTPATIENT)
Dept: PULMONOLOGY | Facility: CLINIC | Age: 74
End: 2024-08-13
Payer: MEDICARE

## 2024-10-08 DIAGNOSIS — E78.5 DYSLIPIDEMIA: ICD-10-CM

## 2024-10-08 RX ORDER — ROSUVASTATIN CALCIUM 10 MG/1
10 TABLET, COATED ORAL DAILY
Qty: 90 TABLET | Refills: 1 | Status: SHIPPED | OUTPATIENT
Start: 2024-10-08

## 2024-10-14 ENCOUNTER — TELEPHONE (OUTPATIENT)
Dept: PULMONOLOGY | Facility: HOSPITAL | Age: 74
End: 2024-10-14
Payer: MEDICARE

## 2024-10-14 NOTE — TELEPHONE ENCOUNTER
Per Dr. Mccarthy, ok to stop daily prednisone. Pt needs repeat PFT's and CT scan prior to next visit. Spoke with pt and updated her on this matter. She verbalized understanding. She has PFT's and CT scan scheduled 12/9/24 and follow up visit with Dr. Mccarthy on 12/20/24.

## 2024-10-14 NOTE — TELEPHONE ENCOUNTER
Pt reached out wanting to stop her daily prednisone. Pt states that she takes 2.5 mg daily and is ready to stop taking it. She stated that at her last visit with Dr. Mccarthy this topic was brought up, but she wasn't ready to stop it at that time. Dr. Mccarthy was notified.

## 2024-11-02 ENCOUNTER — OFFICE VISIT (OUTPATIENT)
Dept: URGENT CARE | Facility: URGENT CARE | Age: 74
End: 2024-11-02
Payer: MEDICARE

## 2024-11-02 ENCOUNTER — HOSPITAL ENCOUNTER (OUTPATIENT)
Dept: RADIOLOGY | Facility: CLINIC | Age: 74
Discharge: HOME | End: 2024-11-02
Payer: MEDICARE

## 2024-11-02 VITALS
WEIGHT: 112.43 LBS | OXYGEN SATURATION: 96 % | TEMPERATURE: 98.1 F | BODY MASS INDEX: 23.5 KG/M2 | DIASTOLIC BLOOD PRESSURE: 93 MMHG | RESPIRATION RATE: 16 BRPM | HEART RATE: 94 BPM | SYSTOLIC BLOOD PRESSURE: 159 MMHG
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
## 2024-11-02 DIAGNOSIS — S40.021A CONTUSION OF MULTIPLE SITES OF RIGHT SHOULDER AND UPPER ARM, INITIAL ENCOUNTER: ICD-10-CM

## 2024-11-02 DIAGNOSIS — M25.511 ACUTE PAIN OF RIGHT SHOULDER: ICD-10-CM

## 2024-11-02 DIAGNOSIS — S46.011A ROTATOR CUFF STRAIN, RIGHT, INITIAL ENCOUNTER: ICD-10-CM

## 2024-11-02 DIAGNOSIS — S40.011A CONTUSION OF MULTIPLE SITES OF RIGHT SHOULDER AND UPPER ARM, INITIAL ENCOUNTER: ICD-10-CM

## 2024-11-02 PROCEDURE — 73030 X-RAY EXAM OF SHOULDER: CPT | Mod: RT

## 2024-11-02 PROCEDURE — 73030 X-RAY EXAM OF SHOULDER: CPT | Mod: RIGHT SIDE | Performed by: RADIOLOGY

## 2024-11-02 ASSESSMENT — ENCOUNTER SYMPTOMS
BACK PAIN: 0
DIARRHEA: 0
WOUND: 0
NECK STIFFNESS: 0
RHINORRHEA: 0
WHEEZING: 0
FEVER: 0
JOINT SWELLING: 1
DYSURIA: 0
SORE THROAT: 0
VOMITING: 0
MYALGIAS: 1
SHORTNESS OF BREATH: 0
CHILLS: 0
CONSTIPATION: 1
NAUSEA: 0
HEADACHES: 0
FREQUENCY: 0
CHEST TIGHTNESS: 0

## 2024-11-07 ENCOUNTER — OFFICE VISIT (OUTPATIENT)
Dept: ORTHOPEDIC SURGERY | Age: 74
End: 2024-11-07

## 2024-11-07 VITALS
TEMPERATURE: 98.1 F | DIASTOLIC BLOOD PRESSURE: 90 MMHG | SYSTOLIC BLOOD PRESSURE: 175 MMHG | HEART RATE: 97 BPM | BODY MASS INDEX: 23.09 KG/M2 | OXYGEN SATURATION: 96 % | HEIGHT: 58 IN | WEIGHT: 110 LBS

## 2024-11-07 DIAGNOSIS — S46.911A STRAIN OF RIGHT SHOULDER, INITIAL ENCOUNTER: Primary | ICD-10-CM

## 2024-11-07 RX ORDER — LISINOPRIL 5 MG/1
5 TABLET ORAL DAILY
COMMUNITY

## 2024-11-07 NOTE — PROGRESS NOTES
Chief Complaint   Patient presents with    Shoulder Pain     Patient presents for right shoulder pain due to a fall 10/14/24. She was walking her dog and the dog pulled her and she fell. She states she has been doing therapy over the last month, but no relief.  She also states her arm hangs and she has tingling in her hand, she has a poulling in her arm as well.        Sharita Askew is a 73 y.o. year old   female who is seen today  for evaluation of right shoulder pain.  She reports the pain has been ongoing for the past 4 weeks.  She does recall a specific injury which started the pain.  She stated her dog pulled her and she fell onto the shoulder.   She reports the pain is worse with movement, better with rest.  The patient does not have mechanical symptoms.  Shedoes have night pain.  She denies a feeling of instability.  The prior treatments have been Ibuprofen, PT.  The patient   has not responded to the treatment. The patient is right hand dominant. The patient is not working.       Chief Complaint   Patient presents with    Shoulder Pain     Patient presents for right shoulder pain due to a fall 10/14/24. She was walking her dog and the dog pulled her and she fell. She states she has been doing therapy over the last month, but no relief.  She also states her arm hangs and she has tingling in her hand, she has a poulling in her arm as well.     Past Medical History:   Diagnosis Date    MRSA infection      Past Surgical History:   Procedure Laterality Date    BACK SURGERY      BONE INCISION AND DRAINAGE  4/19/2011    left knee    DILATION AND CURETTAGE OF UTERUS      HYSTERECTOMY (CERVIX STATUS UNKNOWN)         Current Outpatient Medications:     lisinopril (PRINIVIL;ZESTRIL) 5 MG tablet, Take 1 tablet by mouth daily, Disp: , Rfl:     leflunomide (ARAVA) 10 MG tablet, , Disp: , Rfl:     hydroxychloroquine (PLAQUENIL) 200 MG tablet, Take 1 tablet by mouth daily, Disp: , Rfl:     rosuvastatin

## 2024-11-18 DIAGNOSIS — M54.12 CERVICAL RADICULOPATHY: Primary | ICD-10-CM

## 2024-11-18 RX ORDER — METHYLPREDNISOLONE 4 MG/1
TABLET ORAL
Qty: 1 KIT | Refills: 0 | Status: SHIPPED | OUTPATIENT
Start: 2024-11-18 | End: 2024-11-24

## 2024-11-21 ENCOUNTER — APPOINTMENT (OUTPATIENT)
Dept: PRIMARY CARE | Facility: CLINIC | Age: 74
End: 2024-11-21
Payer: MEDICARE

## 2024-11-21 ENCOUNTER — LAB (OUTPATIENT)
Dept: LAB | Facility: LAB | Age: 74
End: 2024-11-21
Payer: MEDICARE

## 2024-11-21 VITALS
HEIGHT: 58 IN | OXYGEN SATURATION: 92 % | HEART RATE: 82 BPM | BODY MASS INDEX: 22.67 KG/M2 | DIASTOLIC BLOOD PRESSURE: 89 MMHG | SYSTOLIC BLOOD PRESSURE: 139 MMHG | WEIGHT: 108 LBS

## 2024-11-21 DIAGNOSIS — E55.9 VITAMIN D DEFICIENCY: ICD-10-CM

## 2024-11-21 DIAGNOSIS — R79.9 ABNORMAL FINDING OF BLOOD CHEMISTRY, UNSPECIFIED: ICD-10-CM

## 2024-11-21 DIAGNOSIS — E78.5 DYSLIPIDEMIA: ICD-10-CM

## 2024-11-21 DIAGNOSIS — Z13.1 SCREENING FOR DIABETES MELLITUS: ICD-10-CM

## 2024-11-21 DIAGNOSIS — Z00.00 ROUTINE GENERAL MEDICAL EXAMINATION AT HEALTH CARE FACILITY: Primary | ICD-10-CM

## 2024-11-21 DIAGNOSIS — I10 PRIMARY HYPERTENSION: ICD-10-CM

## 2024-11-21 DIAGNOSIS — R53.82 CHRONIC FATIGUE: ICD-10-CM

## 2024-11-21 DIAGNOSIS — Z12.31 ENCOUNTER FOR SCREENING MAMMOGRAM FOR MALIGNANT NEOPLASM OF BREAST: ICD-10-CM

## 2024-11-21 LAB
ALBUMIN SERPL BCP-MCNC: 4.7 G/DL (ref 3.4–5)
ALP SERPL-CCNC: 59 U/L (ref 33–136)
ALT SERPL W P-5'-P-CCNC: 22 U/L (ref 7–45)
ANION GAP SERPL CALC-SCNC: 19 MMOL/L (ref 10–20)
APPEARANCE UR: CLEAR
AST SERPL W P-5'-P-CCNC: 24 U/L (ref 9–39)
BASOPHILS # BLD AUTO: 0.03 X10*3/UL (ref 0–0.1)
BASOPHILS NFR BLD AUTO: 0.4 %
BILIRUB SERPL-MCNC: 0.6 MG/DL (ref 0–1.2)
BILIRUB UR STRIP.AUTO-MCNC: NEGATIVE MG/DL
BUN SERPL-MCNC: 14 MG/DL (ref 6–23)
CALCIUM SERPL-MCNC: 9.6 MG/DL (ref 8.6–10.3)
CHLORIDE SERPL-SCNC: 100 MMOL/L (ref 98–107)
CHOLEST SERPL-MCNC: 186 MG/DL (ref 0–199)
CHOLESTEROL/HDL RATIO: 2.4
CO2 SERPL-SCNC: 28 MMOL/L (ref 21–32)
COLOR UR: NORMAL
CREAT SERPL-MCNC: 0.58 MG/DL (ref 0.5–1.05)
EGFRCR SERPLBLD CKD-EPI 2021: >90 ML/MIN/1.73M*2
EOSINOPHIL # BLD AUTO: 0.22 X10*3/UL (ref 0–0.4)
EOSINOPHIL NFR BLD AUTO: 3.1 %
ERYTHROCYTE [DISTWIDTH] IN BLOOD BY AUTOMATED COUNT: 12.3 % (ref 11.5–14.5)
GLUCOSE SERPL-MCNC: 92 MG/DL (ref 74–99)
GLUCOSE UR STRIP.AUTO-MCNC: NORMAL MG/DL
HCT VFR BLD AUTO: 42.8 % (ref 36–46)
HDLC SERPL-MCNC: 77 MG/DL
HGB BLD-MCNC: 13.9 G/DL (ref 12–16)
IMM GRANULOCYTES # BLD AUTO: 0.01 X10*3/UL (ref 0–0.5)
IMM GRANULOCYTES NFR BLD AUTO: 0.1 % (ref 0–0.9)
KETONES UR STRIP.AUTO-MCNC: NEGATIVE MG/DL
LDLC SERPL CALC-MCNC: 77 MG/DL
LEUKOCYTE ESTERASE UR QL STRIP.AUTO: NEGATIVE
LYMPHOCYTES # BLD AUTO: 2.88 X10*3/UL (ref 0.8–3)
LYMPHOCYTES NFR BLD AUTO: 40.1 %
MCH RBC QN AUTO: 29.4 PG (ref 26–34)
MCHC RBC AUTO-ENTMCNC: 32.5 G/DL (ref 32–36)
MCV RBC AUTO: 91 FL (ref 80–100)
MONOCYTES # BLD AUTO: 0.86 X10*3/UL (ref 0.05–0.8)
MONOCYTES NFR BLD AUTO: 12 %
NEUTROPHILS # BLD AUTO: 3.19 X10*3/UL (ref 1.6–5.5)
NEUTROPHILS NFR BLD AUTO: 44.3 %
NITRITE UR QL STRIP.AUTO: NEGATIVE
NON HDL CHOLESTEROL: 109 MG/DL (ref 0–149)
NRBC BLD-RTO: 0 /100 WBCS (ref 0–0)
PH UR STRIP.AUTO: 5.5 [PH]
PLATELET # BLD AUTO: 299 X10*3/UL (ref 150–450)
POTASSIUM SERPL-SCNC: 3.5 MMOL/L (ref 3.5–5.3)
PROT SERPL-MCNC: 7.6 G/DL (ref 6.4–8.2)
PROT UR STRIP.AUTO-MCNC: NEGATIVE MG/DL
RBC # BLD AUTO: 4.72 X10*6/UL (ref 4–5.2)
RBC # UR STRIP.AUTO: NEGATIVE /UL
SODIUM SERPL-SCNC: 143 MMOL/L (ref 136–145)
SP GR UR STRIP.AUTO: 1.01
TRIGL SERPL-MCNC: 160 MG/DL (ref 0–149)
UROBILINOGEN UR STRIP.AUTO-MCNC: NORMAL MG/DL
VLDL: 32 MG/DL (ref 0–40)
WBC # BLD AUTO: 7.2 X10*3/UL (ref 4.4–11.3)

## 2024-11-21 PROCEDURE — 1170F FXNL STATUS ASSESSED: CPT

## 2024-11-21 PROCEDURE — 1159F MED LIST DOCD IN RCRD: CPT

## 2024-11-21 PROCEDURE — 82607 VITAMIN B-12: CPT

## 2024-11-21 PROCEDURE — 85025 COMPLETE CBC W/AUTO DIFF WBC: CPT

## 2024-11-21 PROCEDURE — 80053 COMPREHEN METABOLIC PANEL: CPT

## 2024-11-21 PROCEDURE — 81003 URINALYSIS AUTO W/O SCOPE: CPT

## 2024-11-21 PROCEDURE — 83036 HEMOGLOBIN GLYCOSYLATED A1C: CPT

## 2024-11-21 PROCEDURE — 1036F TOBACCO NON-USER: CPT

## 2024-11-21 PROCEDURE — 1123F ACP DISCUSS/DSCN MKR DOCD: CPT

## 2024-11-21 PROCEDURE — 3008F BODY MASS INDEX DOCD: CPT

## 2024-11-21 PROCEDURE — 1158F ADVNC CARE PLAN TLK DOCD: CPT

## 2024-11-21 PROCEDURE — G0439 PPPS, SUBSEQ VISIT: HCPCS

## 2024-11-21 PROCEDURE — 1125F AMNT PAIN NOTED PAIN PRSNT: CPT

## 2024-11-21 PROCEDURE — 1160F RVW MEDS BY RX/DR IN RCRD: CPT

## 2024-11-21 PROCEDURE — 36415 COLL VENOUS BLD VENIPUNCTURE: CPT

## 2024-11-21 PROCEDURE — 82306 VITAMIN D 25 HYDROXY: CPT

## 2024-11-21 PROCEDURE — 3079F DIAST BP 80-89 MM HG: CPT

## 2024-11-21 PROCEDURE — 3075F SYST BP GE 130 - 139MM HG: CPT

## 2024-11-21 PROCEDURE — 80061 LIPID PANEL: CPT

## 2024-11-21 RX ORDER — METHYLPREDNISOLONE 4 MG/1
TABLET ORAL
COMMUNITY
Start: 2024-11-18

## 2024-11-21 RX ORDER — DICLOFENAC SODIUM 10 MG/G
GEL TOPICAL
COMMUNITY
Start: 2024-07-11

## 2024-11-21 RX ORDER — LISINOPRIL 10 MG/1
10 TABLET ORAL DAILY
Qty: 100 TABLET | Refills: 3 | Status: SHIPPED | OUTPATIENT
Start: 2024-11-21 | End: 2024-11-21 | Stop reason: ENTERED-IN-ERROR

## 2024-11-21 RX ORDER — LISINOPRIL 5 MG/1
5 TABLET ORAL DAILY
Qty: 90 TABLET | Refills: 3 | Status: SHIPPED | OUTPATIENT
Start: 2024-11-21 | End: 2025-11-21

## 2024-11-21 RX ORDER — DEXTROMETHORPHAN POLISTIREX 30 MG/5 ML
SUSPENSION, EXTENDED RELEASE 12 HR ORAL
COMMUNITY
Start: 2023-01-24

## 2024-11-21 ASSESSMENT — ACTIVITIES OF DAILY LIVING (ADL)
BATHING: INDEPENDENT
MANAGING_FINANCES: INDEPENDENT
DRESSING: INDEPENDENT
TAKING_MEDICATION: INDEPENDENT
DOING_HOUSEWORK: INDEPENDENT
GROCERY_SHOPPING: INDEPENDENT

## 2024-11-21 ASSESSMENT — ENCOUNTER SYMPTOMS
CARDIOVASCULAR NEGATIVE: 1
GASTROINTESTINAL NEGATIVE: 1
RESPIRATORY NEGATIVE: 1
CONSTITUTIONAL NEGATIVE: 1
ARTHRALGIAS: 1
NEUROLOGICAL NEGATIVE: 1
ALLERGIC/IMMUNOLOGIC NEGATIVE: 1
PSYCHIATRIC NEGATIVE: 1
HEMATOLOGIC/LYMPHATIC NEGATIVE: 1

## 2024-11-21 ASSESSMENT — PATIENT HEALTH QUESTIONNAIRE - PHQ9
2. FEELING DOWN, DEPRESSED OR HOPELESS: NOT AT ALL
1. LITTLE INTEREST OR PLEASURE IN DOING THINGS: NOT AT ALL
SUM OF ALL RESPONSES TO PHQ9 QUESTIONS 1 AND 2: 0

## 2024-11-21 ASSESSMENT — PAIN SCALES - GENERAL: PAINLEVEL_OUTOF10: 4

## 2024-11-21 NOTE — ASSESSMENT & PLAN NOTE
Did not take medications to do labs  BP above goal < 140/90 at several appointments  - feels thatelevated due to pain in right shoulder  Orders:    lisinopril 5 mg tablet; Take 1 tablet (5 mg) by mouth once daily.

## 2024-11-21 NOTE — PROGRESS NOTES
Subjective   Reason for Visit: Aileen Perea is an 73 y.o. female here for a Medicare Wellness visit.     Past Medical, Surgical, and Family History reviewed and updated in chart.    Reviewed all medications by prescribing practitioner or clinical pharmacist (such as prescriptions, OTCs, herbal therapies and supplements) and documented in the medical record.    HPI    Reported Health: Good    Dental, Vision, Hearing:  Regular dental visits: yes  - Brushes teeth 2 times per day  Vision problems: yes  - Wears glasses or contacts? yes  - Last eye exam: yearly  Hearing loss: no    Immunization status:  Up to date: yes    Lifestyle:  Healthy diet: yes  Regular exercise: yes  - Exercise frequency: daily  - Type of exercise:   Alcohol: no quite 2 years ago  Tobacco: no  Drugs: no    Reproductive Health:  Menopausal: yes  Menopause symptoms: no  Bladder concerns:      Cervical Cancer Screening:  Last pap: unsure  History of abnormal pap smear? no  Breast Cancer Screening:  Last mammogram: 11/23  Colorectal Cancer Screening:  Last colonoscopy or Cologuard: 6/22  Metabolic Screening:  Lipid profile: ordered  Glucose screen: ordered      Patient Care Team:  DIAMOND Noyola as PCP - General (Family Medicine)     Previous History  Medical History        Past Medical History:   Diagnosis Date    Personal history of other diseases of the respiratory system 12/21/2022     History of acute bronchitis         Surgical History         Past Surgical History:   Procedure Laterality Date    HYSTERECTOMY        OTHER SURGICAL HISTORY   07/19/2021     Back surgery    OTHER SURGICAL HISTORY   07/19/2021     Hysterectomy    OTHER SURGICAL HISTORY   07/19/2021     Knee surgery    OTHER SURGICAL HISTORY   07/19/2021     Dilation and curettage         Social History   Social History           Tobacco Use    Smoking status: Never    Smokeless tobacco: Never   Vaping Use    Vaping status: Never Used   Substance Use Topics     "Alcohol use: Never    Drug use: Never         Family History          Family History   Problem Relation Name Age of Onset    Breast cancer Maternal Grandmother             Allergies        Allergies   Allergen Reactions    Meperidine Unknown              Current Outpatient Medications   Medication Instructions    calcium-vitamin D3-vitamin K 500 mg-1,000 unit-40 mcg tablet,chewable Chew.    diclofenac sodium (Voltaren) 1 % gel APPLY TOPICALLY EVERY 8 HOURS AS NEEDED    leflunomide (Arava) 10 mg tablet      lisinopril 5 mg, oral, Daily    methylPREDNISolone (Medrol Dospak) 4 mg tablets      PlaqueniL 200 mg, Daily    rosuvastatin (CRESTOR) 10 mg, oral, Daily        Review of Systems   Constitutional: Negative.    HENT: Negative.     Respiratory: Negative.     Cardiovascular: Negative.    Gastrointestinal: Negative.    Genitourinary: Negative.    Musculoskeletal:  Positive for arthralgias (right shoulder).   Skin: Negative.    Allergic/Immunologic: Negative.    Neurological: Negative.    Hematological: Negative.    Psychiatric/Behavioral: Negative.         Objective   Vitals:  /89   Pulse 82   Ht 1.473 m (4' 10\")   Wt 49 kg (108 lb)   SpO2 92%   BMI 22.57 kg/m²       Physical Exam  Vitals and nursing note reviewed.   Constitutional:       Appearance: Normal appearance. She is normal weight.   HENT:      Head: Normocephalic.      Nose: Congestion present.      Mouth/Throat:      Mouth: Mucous membranes are moist.      Pharynx: Oropharynx is clear.   Eyes:      Extraocular Movements: Extraocular movements intact.      Conjunctiva/sclera: Conjunctivae normal.      Pupils: Pupils are equal, round, and reactive to light.   Cardiovascular:      Rate and Rhythm: Normal rate and regular rhythm.      Pulses: Normal pulses.      Heart sounds: Normal heart sounds. No murmur heard.  Pulmonary:      Effort: Pulmonary effort is normal. No respiratory distress.      Breath sounds: Normal breath sounds. No wheezing, " rhonchi or rales.   Abdominal:      General: Abdomen is flat. Bowel sounds are normal.      Palpations: Abdomen is soft.   Musculoskeletal:         General: Normal range of motion.      Cervical back: Normal range of motion and neck supple.   Skin:     General: Skin is warm and dry.      Capillary Refill: Capillary refill takes less than 2 seconds.   Neurological:      General: No focal deficit present.      Mental Status: She is alert. Mental status is at baseline.   Psychiatric:         Mood and Affect: Mood normal.         Behavior: Behavior normal.         Thought Content: Thought content normal.         Judgment: Judgment normal.       Assessment & Plan  Encounter for screening mammogram for malignant neoplasm of breast    Orders:    BI mammo bilateral screening tomosynthesis; Future    Vitamin D deficiency    Orders:    Vitamin D 25-Hydroxy,Total (for eval of Vitamin D levels); Future    Chronic fatigue    Orders:    Urinalysis with Reflex Microscopic; Future    Vitamin B12; Future    Screening for diabetes mellitus    Orders:    Hemoglobin A1c; Future    Dyslipidemia    Orders:    CBC and Auto Differential; Future    Comprehensive metabolic panel; Future    Lipid panel; Future    Abnormal finding of blood chemistry, unspecified    Orders:    Hemoglobin A1c; Future    Routine general medical examination at health care facility    Orders:    1 Year Follow Up In Primary Care - Wellness Exam; Future    Primary hypertension  Did not take medications to do labs  BP above goal < 140/90 at several appointments  - feels thatelevated due to pain in right shoulder  Orders:    lisinopril 5 mg tablet; Take 1 tablet (5 mg) by mouth once daily.    It was great to see you today!  Please continue taking your prescribed medications.   Refill have been sent to your pharmacy.    I have ordered some labs to be done as soon as you can.  We will call you with the results.    I have ordered you a mammogram to be done as soon as you  are able.  We will call the results.    Patient was identified as a fall risk. Risk prevention instructions provided.

## 2024-11-21 NOTE — PROGRESS NOTES
Aileen Perea is a 73 y.o. female who is presenting for annual physical exam.     Last  Visit: ***  Reported Health: {GOOD/FAIR/POOR (Optional):46607}    Dental, Vision, Hearing:  Regular dental visits: {yes/no:78094}  - Brushes teeth {1, 2, 3+:90385} times per day  - Flosses? {yes/no:35873}  Vision problems: {yes/no:24838}  - Wears glasses or contacts? {yes/no:22859}  - Last eye exam: {DATE:}  Hearing loss: {yes/no:77059}    Immunization status:  Up to date: {yes/no:18307}    Lifestyle:  Healthy diet: {yes/no:41158}  Regular exercise: {yes/no:45784}  - Exercise frequency:   - Type of exercise:   Alcohol: {yes/no:55005}  Tobacco: {yes/no:04479}  Drugs: {yes/no:01345}    Reproductive Health:  Menopausal:   Menstrual problems: {yes/no:00398}  - LMP: {DATE:}  Sexually active: {yes/no:67677}  Contraception:   Menopause symptoms:  Bladder concerns:    Pregnancy History:   :   Parity:   - Full term:   - Premature:  - (s):  - Living:  - AB Induced:  - AB Spont:  - Ectopic:   - Multiple births:    Cervical Cancer Screening:  Last pap: {DATE:}  History of abnormal pap smear? {yes/no:17473}  Breast Cancer Screening:  Last mammogram: {DATE:}  Colorectal Cancer Screening:  Last colonoscopy or Cologuard: {DATE:}  Metabolic Screening:  Lipid profile:   Glucose screen:     Review of Systems  Gen: denies fever, chills, weight loss, fatigue  HEENT: denies sinus pressure, sinus congestion, runny nose, red eyes, itchy eyes, vision loss, ear pain, hearing loss, throat pain, trouble swallowing  Neck: denies neck pain, neck swelling or masses  Chest/breast: denies breast pain, breast lumps, nipple discharge  CV: denies chest pain, palpitations, fast heart rate, syncope  Resp: denies shortness of breath, cough, wheezing  GI: denies abdominal pain, nausea, diarrhea, constipation, hematochezia, melena  : denies dysuria, hematuria, vaginal/penile discharge, frequency  Endo: denies polydipsia,  polyuria, heat/cold intolerance, weight change, hair thinning  Heme: denies easy bruising, easy bleeding  Neuro: denies headache, numbness, tingling, memory loss, changes in vision  MSK: denies joint pain, joint swelling, weakness  Psych: denies suicidal ideation, homicidal ideation, depression, anxiety, mood swings  Skin: denies rashes, abnormal lesions, itching, changes in moles     Previous History  Past Medical History:   Diagnosis Date    Personal history of other diseases of the respiratory system 12/21/2022    History of acute bronchitis     Past Surgical History:   Procedure Laterality Date    HYSTERECTOMY      OTHER SURGICAL HISTORY  07/19/2021    Back surgery    OTHER SURGICAL HISTORY  07/19/2021    Hysterectomy    OTHER SURGICAL HISTORY  07/19/2021    Knee surgery    OTHER SURGICAL HISTORY  07/19/2021    Dilation and curettage     Social History     Tobacco Use    Smoking status: Never    Smokeless tobacco: Never   Vaping Use    Vaping status: Never Used   Substance Use Topics    Alcohol use: Never    Drug use: Never     Family History   Problem Relation Name Age of Onset    Breast cancer Maternal Grandmother       Allergies   Allergen Reactions    Meperidine Unknown     Current Outpatient Medications   Medication Instructions    calcium-vitamin D3-vitamin K 500 mg-1,000 unit-40 mcg tablet,chewable Chew.    diclofenac sodium (Voltaren) 1 % gel APPLY TOPICALLY EVERY 8 HOURS AS NEEDED    leflunomide (Arava) 10 mg tablet     lisinopril 5 mg, oral, Daily    methylPREDNISolone (Medrol Dospak) 4 mg tablets     PlaqueniL 200 mg, Daily    rosuvastatin (CRESTOR) 10 mg, oral, Daily       Physical Exam:  General: Alert and oriented, in no acute distress. Appears stated age, well-nourished, and well hydrated  HEENT:  - Head: Normocephalic and atraumatic   - Eyes: EOMI, PERRLA  - ENT: Hearing grossly intact. Mucus membranes pink and moist without lesions. Tonsils present without swelling or exudates. Good dentition.  TMs gray  Neck: Supple. No stiffness. No thyromegaly or thyroid nodules  Heart: RRR. No murmurs, clicks, or rubs  Lungs: Unlabored breathing. CTAB with no crackles, wheezes, or rhonchi  Abdomen: Normal BS in all 4 quadrants. Soft, non-tender, non-distended, with no masses  Extremities: Warm and well perfused. No edema. Normal peripheral pulses  Musculoskeletal: ROM intact. Strength 5/5 in BUE and BLE. No joint swelling. Normal gait and station  Neurological: Alert and oriented. No gross neurological deficits. Normal sensation. No weakness. DTRs +2/4   Psychological: Appropriate mood and affect  Skin: No rash, abnormal lesions, cyanosis, or erythema      Assessment and Plan     Annual Physical      RTC

## 2024-11-22 LAB
25(OH)D3 SERPL-MCNC: 48 NG/ML (ref 30–100)
EST. AVERAGE GLUCOSE BLD GHB EST-MCNC: 108 MG/DL
HBA1C MFR BLD: 5.4 %
VIT B12 SERPL-MCNC: 514 PG/ML (ref 211–911)

## 2024-11-25 ENCOUNTER — OFFICE VISIT (OUTPATIENT)
Dept: PHYSICAL MEDICINE AND REHAB | Age: 74
End: 2024-11-25
Payer: MEDICARE

## 2024-11-25 VITALS
SYSTOLIC BLOOD PRESSURE: 158 MMHG | BODY MASS INDEX: 23.51 KG/M2 | HEIGHT: 58 IN | WEIGHT: 112 LBS | HEART RATE: 102 BPM | DIASTOLIC BLOOD PRESSURE: 82 MMHG

## 2024-11-25 DIAGNOSIS — G54.0 BRACHIAL PLEXOPATHY: Primary | ICD-10-CM

## 2024-11-25 PROCEDURE — 99204 OFFICE O/P NEW MOD 45 MIN: CPT | Performed by: PHYSICAL MEDICINE & REHABILITATION

## 2024-11-25 PROCEDURE — 1090F PRES/ABSN URINE INCON ASSESS: CPT | Performed by: PHYSICAL MEDICINE & REHABILITATION

## 2024-11-25 PROCEDURE — G8427 DOCREV CUR MEDS BY ELIG CLIN: HCPCS | Performed by: PHYSICAL MEDICINE & REHABILITATION

## 2024-11-25 PROCEDURE — G8420 CALC BMI NORM PARAMETERS: HCPCS | Performed by: PHYSICAL MEDICINE & REHABILITATION

## 2024-11-25 PROCEDURE — G8484 FLU IMMUNIZE NO ADMIN: HCPCS | Performed by: PHYSICAL MEDICINE & REHABILITATION

## 2024-11-25 NOTE — PROGRESS NOTES
symptoms: Not present: falls, subjective weakness, hematuria, nausea, vomiting or rash. Present: paresthesias    Recall, prior treatments have included:   Treatment Date Effective   Rest X    Ice X    Heat     NSAID     Analgesic     Skeletal muscle relaxant     Oral steroids X    Antidepressant     Anticonvulsant     Topical     Supplements     Physical therapy X    Home exercise program     Manipulation     Massage     Aquatic     Traction     Electric stimulation     Injections     Ergonomics     Alternative therapy     Surgery     Bracing     Activity modifications     Cognitive behavioral therapy       ADL: Self-care  Mobility: independence Device: None  Exercise: never  Work history: retired  Education level: College  Support system: lives with       Past medical, surgical, family, social history, allergies and medications were otherwise reviewed in Epic.      Review of systems was completed by patient and reviewed with me and is scanned in media separately.     Physical Exam:   Blood pressure (!) 158/82, pulse (!) 102, height 1.473 m (4' 10\"), weight 50.8 kg (112 lb).   General: No apparent distress.   Habitus: Body mass index is 23.41 kg/m². Normal weight (18.5-24.9)   MSK:   Cervical: Cervical lordosis normal,  thoracic kyphosis normal and lumbar lordosis normal. No superficial or bony tenderness.  No tenderness to palpation at bilateral cervical paraspinals,  upper trapezius,  occipital notch,  sternocleidomastoid,  medial scapular muscles,  scalenes.  No trigger points.  No spasm.   No edema, erythema, ecchymosis, effusion, instability, mass or deformity. No midline bony tenderness. Spurling is negative bilateral.  Cervical AROM in flexion is 60 degrees, in extension is 60 degrees, in left rotation is 80 degrees, in right rotation is  80 degrees, in left lateral flexion is 45 degrees and in right lateral flexion is 45 degrees.  There is no crepitus.  bilateral Shoulder: No edema, erythema,

## 2024-11-26 ENCOUNTER — TELEPHONE (OUTPATIENT)
Dept: PHYSICAL MEDICINE AND REHAB | Age: 74
End: 2024-11-26

## 2024-11-26 ENCOUNTER — PROCEDURE VISIT (OUTPATIENT)
Dept: PHYSICAL MEDICINE AND REHAB | Age: 74
End: 2024-11-26
Payer: MEDICARE

## 2024-11-26 VITALS — WEIGHT: 112 LBS | BODY MASS INDEX: 23.51 KG/M2 | HEIGHT: 58 IN

## 2024-11-26 DIAGNOSIS — G54.0 BRACHIAL PLEXOPATHY: ICD-10-CM

## 2024-11-26 DIAGNOSIS — G54.0 BRACHIAL PLEXOPATHY: Primary | ICD-10-CM

## 2024-11-26 PROCEDURE — 95886 MUSC TEST DONE W/N TEST COMP: CPT | Performed by: PHYSICAL MEDICINE & REHABILITATION

## 2024-11-26 PROCEDURE — 95913 NRV CNDJ TEST 13/> STUDIES: CPT | Performed by: PHYSICAL MEDICINE & REHABILITATION

## 2024-11-26 NOTE — PATIENT INSTRUCTIONS
[facility]  Accredited [facility] the [facility] with Exemplary status  ATA Hernandez D.O.   [facility]  [address] [place] [address]  Phone: [phone]  Fax: [phone]        Today you had an electrodiagnostic exam which included nerve conduction studies (NCS) and electromyography (EMG). This test evaluated the electrical activity of your nerves and muscles to help determine if you have a nerve or muscle disease.  This test can help determine the location and type of a nerve or muscle problem. This will help your referring doctor diagnose your condition and determine the appropriate next step in your treatment plan.     After your test:    1. There are no long lasting side effects of the test.     2. You may resume your normal activities without restrictions.     3.  Resume any medications that were stopped for the test.     4  If you have sore areas or bruising in your muscles where the needle was placed, apply a cold pack to the sore area for 15-20 minutes three to four times a day as needed for pain.  The soreness should go away in about 1-2 days.     5. Your results were provided  Briefly at the end of your test and the final detailed report will be provided to your referring physician, and/or primary care physician and any other parties you requested within 1-2 days of the examination. You may wish to contact your referring provider after a few days to determine what they would like you to do next.     6.  Please call [phone] with any questions or concerns and if you develop increased body temperature/fever, swelling, tenderness, increased pain and/or drainage from the sites where the needle was placed.     Thank you for choosing us for your health care needs.

## 2024-11-26 NOTE — PROGRESS NOTES
Elbow APB 7.29 7.2 Elbow - Wrist 19 57 32   R Ulnar - ADM      Wrist ADM 4.01 8.8 Wrist - ADM 8  32      B.Elbow ADM 7.24 6.1 B.Elbow - Wrist 17 53 32      A.Elbow ADM 9.69 5.8 A.Elbow - B.Elbow 10 41* 32   L Ulnar - ADM      Wrist ADM 3.18 11.2 Wrist - ADM 8  32      B.Elbow ADM 6.04 10.3 B.Elbow - Wrist 17 59 32      A.Elbow ADM 7.76 10.3 A.Elbow - B.Elbow 10 58 32   R Musculocutaneous - Biceps      Erb's Pt Biceps 4.79 7.3 Erb's Pt - Biceps   32   L Musculocutaneous - Biceps      Erb's Pt Biceps 4.17 10.9 Erb's Pt - Biceps   32       F  Wave      Nerve Fmin % F    ms %   R Median - APB 26.09 40   R Ulnar - ADM 30.10 60   L Ulnar - ADM 25.57 60       EMG      EMG Summary Table     Spontaneous MUAP Recruitment   Muscle Nerve Roots IA Fib PSW Fasc Amp Dur. PPP Pattern   R. Biceps brachii Musculocutaneous C5-C6 N None None None N N N N   R. Triceps brachii Radial C6-C8 N None None None N N N N   R. Pronator teres Median C6-C7 1+ None None None N N N N   R. Extensor indicis proprius Radial C7-C8 N None None None N N N N   R. First dorsal interosseous Ulnar C8-T1 N 1+ 1+ None N 1+ 1+ Reduced   R. Abductor pollicis brevis Median C8-T1 N None None None N 1+ 1+ Reduced   R. Flexor digitorum profundus, dig 4 & 5 Ulnar C8-T1 N 2+ 2+ None N 1+ 1+ Reduced   R. Pectoralis major (Clavicular head)  C5-C6 1+ None None None N N N N   R. Rhomboid major Dorsal scapular C5- N None None None N N N N   R. Supraspinatus Suprascapular C5-C6 2+ None None None N N N N   R. Cervical paraspinals (low)  - N None None None NT NT NT NT   R. Cervical paraspinals (mid)  - N None None None NT NT NT NT        Study Limitations:  none    Summary of Findings:   Nerve conduction studies:   The following nerve conduction studies were abnormal:   The right median sensory latency was prolonged at the wrist, normal at the palm.  The right ulnar sensory response was small.   The right lateral antebrachial cutaneous response was less than half the

## 2024-11-27 ENCOUNTER — TELEPHONE (OUTPATIENT)
Dept: PHYSICAL MEDICINE AND REHAB | Age: 74
End: 2024-11-27

## 2024-11-27 NOTE — TELEPHONE ENCOUNTER
----- Message from Dr. Erica Bernardo DO sent at 11/27/2024  1:25 PM EST -----  Reviewed test abnormal, inform patient that it showed degenerative changes.

## 2024-12-06 ENCOUNTER — TELEPHONE (OUTPATIENT)
Dept: PHYSICAL MEDICINE AND REHAB | Age: 74
End: 2024-12-06

## 2024-12-06 NOTE — TELEPHONE ENCOUNTER
Called and spoke with the patient and informed her that the authorization would have to be done for [facility] before they can schedule the MRI.  Patient is aware and voiced understanding.

## 2024-12-06 NOTE — TELEPHONE ENCOUNTER
Pt called in she needs to speak with the office, Dr. Bernardo put in an order for her to have an MRI, she tried to garland it at [facility] and they advised her the MRI needed to be auth first. Sharita Meza can be reached at [phone]

## 2024-12-09 ENCOUNTER — HOSPITAL ENCOUNTER (OUTPATIENT)
Dept: RADIOLOGY | Facility: HOSPITAL | Age: 74
Discharge: HOME | End: 2024-12-09
Payer: MEDICARE

## 2024-12-09 ENCOUNTER — HOSPITAL ENCOUNTER (OUTPATIENT)
Dept: RESPIRATORY THERAPY | Facility: HOSPITAL | Age: 74
Discharge: HOME | End: 2024-12-09
Payer: MEDICARE

## 2024-12-09 VITALS — WEIGHT: 110 LBS | BODY MASS INDEX: 23.09 KG/M2 | HEIGHT: 58 IN

## 2024-12-09 DIAGNOSIS — J84.9 ILD (INTERSTITIAL LUNG DISEASE) (MULTI): ICD-10-CM

## 2024-12-09 DIAGNOSIS — Z12.31 ENCOUNTER FOR SCREENING MAMMOGRAM FOR MALIGNANT NEOPLASM OF BREAST: ICD-10-CM

## 2024-12-09 LAB
MGC ASCENT PFT - FEV1 - POST: 1.06
MGC ASCENT PFT - FEV1 - POST: 1.06
MGC ASCENT PFT - FEV1 - PRE: 0.99
MGC ASCENT PFT - FEV1 - PRE: 0.99
MGC ASCENT PFT - FEV1 - PREDICTED: 1.63
MGC ASCENT PFT - FEV1 - PREDICTED: 1.63
MGC ASCENT PFT - FVC - POST: 1.21
MGC ASCENT PFT - FVC - POST: 1.21
MGC ASCENT PFT - FVC - PRE: 1.24
MGC ASCENT PFT - FVC - PRE: 1.24
MGC ASCENT PFT - FVC - PREDICTED: 2.06
MGC ASCENT PFT - FVC - PREDICTED: 2.06

## 2024-12-09 PROCEDURE — 71250 CT THORAX DX C-: CPT | Performed by: RADIOLOGY

## 2024-12-09 PROCEDURE — 94729 DIFFUSING CAPACITY: CPT | Mod: MUE

## 2024-12-09 PROCEDURE — 94618 PULMONARY STRESS TESTING: CPT

## 2024-12-09 PROCEDURE — 94618 PULMONARY STRESS TESTING: CPT | Performed by: PEDIATRICS

## 2024-12-09 PROCEDURE — 94060 EVALUATION OF WHEEZING: CPT

## 2024-12-09 PROCEDURE — 71250 CT THORAX DX C-: CPT

## 2024-12-09 PROCEDURE — 94729 DIFFUSING CAPACITY: CPT | Performed by: PEDIATRICS

## 2024-12-09 PROCEDURE — 94060 EVALUATION OF WHEEZING: CPT | Performed by: PEDIATRICS

## 2024-12-09 PROCEDURE — 94664 DEMO&/EVAL PT USE INHALER: CPT

## 2024-12-09 PROCEDURE — 94729 DIFFUSING CAPACITY: CPT

## 2024-12-09 PROCEDURE — 77067 SCR MAMMO BI INCL CAD: CPT | Performed by: RADIOLOGY

## 2024-12-09 PROCEDURE — 77067 SCR MAMMO BI INCL CAD: CPT

## 2024-12-09 PROCEDURE — 77063 BREAST TOMOSYNTHESIS BI: CPT | Performed by: RADIOLOGY

## 2024-12-13 ENCOUNTER — TELEMEDICINE (OUTPATIENT)
Dept: PULMONOLOGY | Facility: HOSPITAL | Age: 74
End: 2024-12-13
Payer: MEDICARE

## 2024-12-13 ENCOUNTER — HOSPITAL ENCOUNTER (OUTPATIENT)
Dept: RADIOLOGY | Facility: HOSPITAL | Age: 74
Discharge: HOME | End: 2024-12-13
Payer: MEDICARE

## 2024-12-13 DIAGNOSIS — G54.0 BRACHIAL PLEXUS DISORDERS: ICD-10-CM

## 2024-12-13 DIAGNOSIS — Z79.52 ON PREDNISONE THERAPY: ICD-10-CM

## 2024-12-13 DIAGNOSIS — J84.9 ILD (INTERSTITIAL LUNG DISEASE) (MULTI): Primary | ICD-10-CM

## 2024-12-13 PROCEDURE — 99214 OFFICE O/P EST MOD 30 MIN: CPT | Performed by: INTERNAL MEDICINE

## 2024-12-13 PROCEDURE — 73221 MRI JOINT UPR EXTREM W/O DYE: CPT

## 2024-12-13 PROCEDURE — G2211 COMPLEX E/M VISIT ADD ON: HCPCS | Performed by: INTERNAL MEDICINE

## 2024-12-13 RX ORDER — PREDNISONE 5 MG/1
TABLET ORAL
Qty: 218 TABLET | Refills: 0 | Status: SHIPPED | OUTPATIENT
Start: 2024-12-13 | End: 2025-03-11

## 2024-12-13 RX ORDER — ALBUTEROL SULFATE 90 UG/1
4 INHALANT RESPIRATORY (INHALATION) ONCE
OUTPATIENT
Start: 2024-12-13

## 2024-12-13 NOTE — PROGRESS NOTES
History of Present Illness  Mrs. Perea is a 74 y.o.  woman never a smoker follows up in pulmonary clinic for ILD, most likely organizing pneumonia     PCP: Dr. Damon     HPI:  New patient here today to establish care. Patient had a CT cardiac scoring which did show bilateral groundglass densities. She also had a chest x-ray which showed the same. Patient was not feeling well at the time she was treated with antibiotics and prednisone and tells me she greatly improved. She still has residual shortness of breath and coughing. This is new patient has always been very healthy. She states she has been short of breath since her sinus infection she had last December. She was tested for Covid in December it was negative has not been tested since although her chest CT does look like it could have been Covid. She still has some rhonchi in her bilateral lower lobes.i am going to order her another round of antibiotics and prednisone and repeat a chest CT and also order her a PFT.     11/17/2021: Ms Perea feels better after prednisone and azithromycin. She is able to walk on flat surfaces without issue but walking up hill is still a struggle for her. She had a repeat CT done which shows mosaic attenuation which is unchanged. She also had a PFT done which shows moderately severe restriction with severe reduction in diffusing capacity.     12/16/2021: Ms Perea is doing ok breathing is fine. I reviewed the rheumatologic labs with her (all are normal)     02/15/2022: Since the last visit, patient's breathing has been improving. No new chest pain, cough, sputum, fever, chills or night sweats. She is walking 77464 steps daily without limitations.     05/10/2022: Since the last visit, patient's breathing has been worsening. Continues to cough and has reduced exercise capacity. Had bronchoscopy with biopsy done (results below).      06/14/2022 since her last visit she feels that her breathing is greatly improved. She has  been taking 40 mg of prednisone with great improvement in her symptoms. She continues to have an occasional dry cough. No significant side effects of prednisone outside of some mood changes and increased energy. She had repeat high-resolution CT scan done as well as a breathing test and a 6-minute walk test (results below).     08/30/2022: Since the last visit, patient's breathing has been improving. CAT 3. Patient had repeat breathing test, done (results below). She is down to 20mg of prednisone. She gained about 4-5 pounds.      11/1/2022: Complains mostly of neck pain. Since the last visit, patient's breathing is mostly unchanged. Stopped the prednisone on 10/07/2022. No new chest pain, cough, sputum, fever, chills or night sweats. Patient had repeat breathing test, 6MWT, CT scan done (results below).      01/10/2023: Going to PT for shoulder and back pain. Feels her breathing is mostly unchagned. Coughs occasionally, uses her nasal sprays. No new chest pain, cough, sputum, fever, chills or night sweats. She was in the emergency department on 11/02/2022, a CTPE was done and was negative for PE. Was seen again in the ED on 12/02, CXR looked slightly worse, she completed a course of levaquin and Medrol pack. Partially improved today.     02/07/2023: Since the last visit, patient's breathing is mostly unchanged. No new chest pain, cough, sputum, fever, chills or night sweats. Complains mostly of joint pain. Had repeat CT done (results below).      03/07/2023: Since the last visit, patient's breathing has been improving. She was saw a rheumatologist and had ?lab work, and hw was started on Plaquenil. 02/16 she started the Plaquenil. Since the last visit, patient's breathing has been improving. CAT of 2.      06/06/2023: Since the last visit, patient's breathing is mostly unchanged. No new chest pain, cough, sputum, fever, chills or night sweats. Rheumatology increased her palquenil to 400mg. Had a fall and hurt her  left knee. Patient had repeat breathing test, 6MWT, CT scan done (results below).      08/29/2023: She is now up to 400mg for plaquenil and rheum added leflunomide. Since the last visit, patient's breathing is mostly unchanged. No new chest pain, cough, sputum, fever, chills or night sweats. Patient had repeat CT scan done (results below). she is still taking 5 mg of prednisone    12/5/2023: Since the last visit, patient's breathing is better. No hospital admissions or ED visits. No new chest pain, cough, sputum, fever, chills or night sweats. Patient active in everyday life goes on walks, climbs stairs, does participates in regimented exercise. Had sinus surgery in August which she thinks helped. Patient had repeat breathing test, 6MWT, CT scan done (results below). Patient compliant with prescribed inhalers using daily plaquenil 200mg once daily and leflulonimde 10mg daily and prednisone 2.5mg.    4/23/2024:  Since the last visit, patient's breathing is mostly unchanged. No hospital admissions or ED visits. No new chest pain, cough, sputum, fever, chills or night sweats. About 5 weeks ago she had knee swelling in her knee and he pred was increased to 20mg for 5 days and was decreased back to her home dose to 2.5mg. She is experiencing palpitations. She was prescribed metoprolol by ED attending. Patient had repeat breathing test, 6MWT (results below).      8/6/2024: Since the last visit, patient's breathing is mostly unchanged. Had another injection for her knee. Patient active in everyday life goes on walks, climbs stairs, does 30444 steps a day. No hospital admissions or ED visits. No new chest pain, cough, sputum, fever, chills or night sweats. She is on the 2.5mg of prednisone. She is also on the plaquenil and the leflunomide for her RA.  She saw cardiology for palpitations and was found to have NSR with ST. Patient had repeat breathing test, 6MWT (results below).     12/13/2024: Since the last visit, patient's  breathing is mostly unchanged. No hospital admissions or ED visits. No new chest pain, cough, sputum, fever, chills or night sweats. Walks 7-10K steps. Occasional cough which she attributes to her sinuses. She is taking palquenil, leflunomide. She stopped her prednisone in September.    Previous pulmonary history:   She has no history of recurrent infections, or lung disease as a child. She had no previous lung hx, never on oxygen or inhaler therapy. She currently is on no supplemental oxygen.      Inhalers/nebulized medications: none     Hospitalization History:  She has not been hospitalized over the last year for breathing related problem.     Sleep history:  Denies snoring, apneas, feeling tired during the day or taking naps during the day. Does not feel tired during the day or take frequent naps.  STOP-BANG score of 2     Comorbidities:   ASCVD  anemia     SH:  smoking: never but second hand smoke from  of 50 years  drinking: none  illicit drug use: none     Occupation: (Full questionnaire on exposures obtained, discussed with the patient and scanned to EMR)  worked as RN  No known exposure to asbestos, silica or beryllium     Family History:  No family history of lung diseases or cancer     Imaging history: (I have personally reviewed the imaging below)  07/24/2023 HRCT with great improvement in inflammation and minimal residual   01/12/2023 HRCT with slight progression of GGO  10/24/2022 HRCT with great improvement but residual GGO in the mediastinal area with some progression of traction bronchiectasis  06/08/2022 (compared to 04/2022) HRCT with great improvement in GGO opacities with some residual  8/11/2021 space a CT cardiac scoring shows a groundglass density bilaterally     PFTs:  12/09/2024 -> Ratio of 0.88/FEV1 1.06L (65%)(no BD response)/FVC 1.21L (58%)/DLCO 51% ->104%   08/05/2024 -> Ratio of 0.88/FEV1 1.24L (75%) (no BD response)/FVC 1.4L (67%) /DLCO 65 -> 114%  04/15/2024 -> Ratio of  0.86/FEV1 1.10L (67%)(no BD response)/FVC 1.28L (61%)/TLC 50%/RVtoTLC ratio 0.53/DLCO 59%  05/30/2023 -> Ratio of 0.86/FEV1 1.23L (74%)(no BD response)/FVC 1.43L (64%)/TLC 54%/RVtoTLC ratio 0.36/DLCO 32%  10/24/2022 -> Ratio of 0.89/FEV1 1.25L (74%)(no BD response)/FVC 1.4L (62%)/DLCO 32 -> 52%  08/23/2022: -> Ratio of 0.9/FEV1 1.37L (81%) (no BD response)/FVC 1.53L (68%)/DLCO 37->60%  06/08/2022 -> FEV1/FVC ratio 0.89/FEV1 1.31L (77%) (no BD response)/FVC 1.48L (65%)     6 MWTs:  08/05/2024 ->on RA, 427m. Peak SpO2 of 99%. Reginald SpO2 96%.   04/15/2024 ->on RA, 446m. Peak SpO2 of 100%. Reginald SpO2 95%.   05/30/2023 ->on RA, 385m. Peak SpO2 of 100%. Reginald SpO2 90%.   10/24/2022 ->on RA, 448m. Peak SpO2 of 99%. Reginald SpO2 93%.   06/08/2022 ->on RA, 455m. Peak SpO2 of 100%. Reginald SpO2 93%.      Lung biopsy:   04/20/2022: BAL done but no differential or CD4/CD8 ratio reported. Lymph node biopsy with non-caseating granulomas. TBBx with organizing PNA.      Echo:   8/2021-> Normal EF 50-55%, mild aortic valve stenosis, moderate aortic valve cusp calcification, mild aortic valve regurgitation     Labs:  : Hb 15.3 , Eos <250, Bicarb 26, Creat 053        Review of Systems:   Pertinent positives and review of systems as noted above.  Remaining 10 review of systems is negative or noncontributory to today's episode of care.     Past Medical History:   Diagnosis Date    Personal history of other diseases of the respiratory system 12/21/2022    History of acute bronchitis        Medication Documentation Review Audit       Reviewed by Danna De Leon LPN (Licensed Nurse) on 11/21/24 at 0845      Medication Order Taking? Sig Documenting Provider Last Dose Status   calcium-vitamin D3-vitamin K 500 mg-1,000 unit-40 mcg tablet,chewable [id number] Yes Chew. Historical Provider, MD  Active   diclofenac sodium (Voltaren) 1 % gel [id number] Yes APPLY TOPICALLY EVERY 8 HOURS AS NEEDED Historical Provider, MD  Active   leflunomide (Arava)  10 mg tablet [id number] Yes  Historical Provider, MD  Active   lisinopril 5 mg tablet [id number] Yes Take 1 tablet (5 mg) by mouth once daily. Haritha Guzmán APRN-CNP  Active   methylPREDNISolone (Medrol Dospak) 4 mg tablets [id number] Yes  Historical Provider, MD  Active   PlaqueniL 200 mg tablet [id number] Yes Take 1 tablet (200 mg) by mouth once daily. Historical Provider, MD  Active     Discontinued 11/21/24 0833   rosuvastatin (Crestor) 10 mg tablet [id number] Yes TAKE 1 TABLET BY MOUTH EVERY DAY Mariya Salazar, APRN-CNP  Active                 Physical Exam  Constitutional: Alert and oriented. In no acute distress. Well developed, well nourished.  Head and Face: Normal.   Oropharynx: normal.  Neck: No neck mass observed.  Pulmonary: Chest is normal to inspection. No increased work of breathing or signs of respiratory distress.   CV: No obvious peripheral edema.  MSK: normal gait and station. No clubbing or cyanosis of the fingernails.  Skin: Normal skin color and pigmentation, normal skin turgor, and no rash.  Neurologic: EOMI. Moving all 4 extremities.  Psychiatric: Intact judgement and insight.     Provider Impressions       # Palpitation 4/23/2024:   he saw cardiology for palpitations and was found to have NSR with ST.     # Ground glass densities on CT consistent with ILD PFT restrictive  - Rheumatologic labs normal HP panel normal  04/20/2022: BAL done without infection. Lymph node biopsy with non-caseating granulomas. TBBx with organizing PNA.   -tapered off prednisone on October 7th 2022  (started on 05/14/2022 at 40mg)  She was in the emergency department on 11/02/2022, a CTPE was done and was negative for PE. Was seen again in the ED on 12/02, CXR looked slightly worse, she completed a course of levaquin and Medrol pack. Partially improved today   -Given CT progression will restart prednisone (02/07/2023) at 20mg and taper by 5mg every 2 weeks.  -will stay on 5mg until next CT     Discussed at ILD  multidisciplinary meeting 5/12/22   - [facility] Consensus Diagnosis: Unclear - concern for alveolar sarcoid vs lymphoproliferative process.    - Recommendation and Management Plan: Recommend biopsy vs treatment.      # Chronic inflammatory joint pain:  -serologies done and negative in Nov 2021  -Saw rheumatology. Labs Pending. Started on Plaquenil     # Chronic sinusitis and deviated septum:  -Had surgery in August with Dr. Bustos from ENT       f/u in 3-4 months

## 2024-12-13 NOTE — PATIENT INSTRUCTIONS
Mrs Perea, it was a pleasure seeing you in clinic today. We discussed the following:     -Your breathing test is slightly decreased but your walking test is improved   -You can continue your prednisone at 2.5 mg daily     Will see you back in clinic in 3-4 months with repeat HRCT, repeat breathing test and walking test     Call [phone] to schedule a breathing or a walking test     Call [phone] to schedule  EKG's, Echocardiograms and Cardiopulmonary Stress Tests.    Call [phone] to schedule Radiology tests such as Nuclear Medicine Stress Tests, CT Scans, and MRI's.    Should you have any questions Please Call our pulmonary nurse Lilia Quijano at [phone]  or my assistant Jeff at [phone]

## 2024-12-17 ENCOUNTER — TELEPHONE (OUTPATIENT)
Dept: PULMONOLOGY | Facility: HOSPITAL | Age: 74
End: 2024-12-17
Payer: MEDICARE

## 2024-12-17 NOTE — TELEPHONE ENCOUNTER
Returned pt's call regarding her MRI. She would like Dr. Mccarthy's opinion on her MRI and if he would like the care plan to change. Pt was instructed to continue on care plan that was discussed at last visit on 12/13/24. She was informed that the office will reach back out if Dr. Mccarthy wants to change the care plan. She verbalized understanding. Dr. Mccarthy was notified.

## 2024-12-19 ENCOUNTER — TELEPHONE (OUTPATIENT)
Dept: PHYSICAL MEDICINE AND REHAB | Age: 74
End: 2024-12-19

## 2024-12-19 DIAGNOSIS — M54.12 CERVICAL RADICULITIS: Primary | ICD-10-CM

## 2024-12-19 NOTE — TELEPHONE ENCOUNTER
No compressive lesion on MRI of the brachial plexus and EMG did not show a brachial plexopathy so the prognosis for recovery is good. She may be getting pain referred from the cervical spine as there is no brachial plexus injury.   Recommend additional physical therapy but focus on cervical spine. Please see if patient agrees.

## 2024-12-19 NOTE — TELEPHONE ENCOUNTER
Patient called and stated that she had MRI brachial done at .  She wanted to know if she needs to make an appointment or what is the next step.  Called  and they do not do disc anymore and they can push the images through to pacs.  Please advise.

## 2024-12-20 ENCOUNTER — APPOINTMENT (OUTPATIENT)
Dept: PULMONOLOGY | Facility: HOSPITAL | Age: 74
End: 2024-12-20
Payer: MEDICARE

## 2025-01-27 ENCOUNTER — TELEPHONE (OUTPATIENT)
Dept: PULMONOLOGY | Facility: HOSPITAL | Age: 75
End: 2025-01-27

## 2025-01-27 DIAGNOSIS — J84.9 ILD (INTERSTITIAL LUNG DISEASE) (MULTI): ICD-10-CM

## 2025-01-27 NOTE — TELEPHONE ENCOUNTER
Returned pt's call regarding not feeling well. She has c/o congestion, wheezing, rattling in chest, sob, and cough with light yellow mucous. She also states feeling feverish, but highest temp 97.9. She is currently on prednisone 10 mg daily. She has tried Nyquil and Dayquil, which helps for a few hours. She stated that she is supposed to have her PFT's tomorrow. Pt was advised to cancel and reschedule PFT's as not feeling well can affect the results of testing. It was explained that she should perform testing when she is at her baseline. She verbalized understanding and agreed to cancel testing. Dr. Mccarthy was notified.

## 2025-01-28 ENCOUNTER — HOSPITAL ENCOUNTER (OUTPATIENT)
Dept: RESPIRATORY THERAPY | Facility: HOSPITAL | Age: 75
End: 2025-01-28
Payer: MEDICARE

## 2025-01-28 RX ORDER — AZITHROMYCIN 250 MG/1
250 TABLET, FILM COATED ORAL DAILY
Qty: 5 TABLET | Refills: 0 | Status: SHIPPED | OUTPATIENT
Start: 2025-01-28 | End: 2025-02-02

## 2025-01-28 NOTE — TELEPHONE ENCOUNTER
Per Dr. Mccarthy, will prescribe z-james course. Spoke with pt and updated her on the plan of care. She verbalized understanding.

## 2025-02-25 ENCOUNTER — HOSPITAL ENCOUNTER (OUTPATIENT)
Dept: RESPIRATORY THERAPY | Facility: HOSPITAL | Age: 75
Discharge: HOME | End: 2025-02-25
Payer: MEDICARE

## 2025-02-25 DIAGNOSIS — J84.9 ILD (INTERSTITIAL LUNG DISEASE) (MULTI): ICD-10-CM

## 2025-02-25 DIAGNOSIS — Z79.52 ON PREDNISONE THERAPY: ICD-10-CM

## 2025-02-25 LAB
MGC ASCENT PFT - FEV1 - POST: 1.03
MGC ASCENT PFT - FEV1 - POST: 1.03
MGC ASCENT PFT - FEV1 - PRE: 1.01
MGC ASCENT PFT - FEV1 - PRE: 1.01
MGC ASCENT PFT - FEV1 - PREDICTED: 1.63
MGC ASCENT PFT - FEV1 - PREDICTED: 1.63
MGC ASCENT PFT - FVC - POST: 1.2
MGC ASCENT PFT - FVC - POST: 1.2
MGC ASCENT PFT - FVC - PRE: 1.21
MGC ASCENT PFT - FVC - PRE: 1.21
MGC ASCENT PFT - FVC - PREDICTED: 2.06
MGC ASCENT PFT - FVC - PREDICTED: 2.06

## 2025-02-25 PROCEDURE — 94060 EVALUATION OF WHEEZING: CPT | Performed by: PEDIATRICS

## 2025-02-25 PROCEDURE — 94729 DIFFUSING CAPACITY: CPT | Performed by: PEDIATRICS

## 2025-02-25 PROCEDURE — 94729 DIFFUSING CAPACITY: CPT

## 2025-02-25 PROCEDURE — 94060 EVALUATION OF WHEEZING: CPT

## 2025-02-25 PROCEDURE — 94760 N-INVAS EAR/PLS OXIMETRY 1: CPT

## 2025-02-28 ENCOUNTER — TELEMEDICINE (OUTPATIENT)
Dept: PULMONOLOGY | Facility: HOSPITAL | Age: 75
End: 2025-02-28
Payer: MEDICARE

## 2025-02-28 DIAGNOSIS — J84.9 ILD (INTERSTITIAL LUNG DISEASE) (MULTI): ICD-10-CM

## 2025-02-28 DIAGNOSIS — M85.89 OSTEOPENIA OF MULTIPLE SITES: Primary | ICD-10-CM

## 2025-02-28 PROCEDURE — G2211 COMPLEX E/M VISIT ADD ON: HCPCS | Performed by: INTERNAL MEDICINE

## 2025-02-28 PROCEDURE — 99214 OFFICE O/P EST MOD 30 MIN: CPT | Performed by: INTERNAL MEDICINE

## 2025-02-28 RX ORDER — ALBUTEROL SULFATE 90 UG/1
4 INHALANT RESPIRATORY (INHALATION) ONCE
OUTPATIENT
Start: 2025-02-28

## 2025-02-28 NOTE — PATIENT INSTRUCTIONS
Mrs Perea, it was a pleasure seeing you in clinic today. We discussed the following:      -You can continue your prednisone at 2.5 mg daily     Will see you back in clinic in 3-4 months with repeat HRCT, repeat breathing test and walking test     Call [phone] to schedule a breathing or a walking test     Call [phone] to schedule  EKG's, Echocardiograms and Cardiopulmonary Stress Tests.    Call [phone] to schedule Radiology tests such as Nuclear Medicine Stress Tests, CT Scans, and MRI's.    Should you have any questions Please Call our pulmonary nurse Lilia Quijano at [phone]  or my assistant Jeff at [phone]

## 2025-02-28 NOTE — PROGRESS NOTES
History of Present Illness  Mrs. Perea is a 74 y.o.  woman never a smoker follows up in pulmonary clinic for ILD, most likely organizing pneumonia     PCP: Dr. Damon     HPI:  New patient here today to establish care. Patient had a CT cardiac scoring which did show bilateral groundglass densities. She also had a chest x-ray which showed the same. Patient was not feeling well at the time she was treated with antibiotics and prednisone and tells me she greatly improved. She still has residual shortness of breath and coughing. This is new patient has always been very healthy. She states she has been short of breath since her sinus infection she had last December. She was tested for Covid in December it was negative has not been tested since although her chest CT does look like it could have been Covid. She still has some rhonchi in her bilateral lower lobes.i am going to order her another round of antibiotics and prednisone and repeat a chest CT and also order her a PFT.     11/17/2021: Ms Perea feels better after prednisone and azithromycin. She is able to walk on flat surfaces without issue but walking up hill is still a struggle for her. She had a repeat CT done which shows mosaic attenuation which is unchanged. She also had a PFT done which shows moderately severe restriction with severe reduction in diffusing capacity.     12/16/2021: Ms Perea is doing ok breathing is fine. I reviewed the rheumatologic labs with her (all are normal)     02/15/2022: Since the last visit, patient's breathing has been improving. No new chest pain, cough, sputum, fever, chills or night sweats. She is walking 18935 steps daily without limitations.     05/10/2022: Since the last visit, patient's breathing has been worsening. Continues to cough and has reduced exercise capacity. Had bronchoscopy with biopsy done (results below).      06/14/2022 since her last visit she feels that her breathing is greatly improved. She has  been taking 40 mg of prednisone with great improvement in her symptoms. She continues to have an occasional dry cough. No significant side effects of prednisone outside of some mood changes and increased energy. She had repeat high-resolution CT scan done as well as a breathing test and a 6-minute walk test (results below).     08/30/2022: Since the last visit, patient's breathing has been improving. CAT 3. Patient had repeat breathing test, done (results below). She is down to 20mg of prednisone. She gained about 4-5 pounds.      11/1/2022: Complains mostly of neck pain. Since the last visit, patient's breathing is mostly unchanged. Stopped the prednisone on 10/07/2022. No new chest pain, cough, sputum, fever, chills or night sweats. Patient had repeat breathing test, 6MWT, CT scan done (results below).      01/10/2023: Going to PT for shoulder and back pain. Feels her breathing is mostly unchagned. Coughs occasionally, uses her nasal sprays. No new chest pain, cough, sputum, fever, chills or night sweats. She was in the emergency department on 11/02/2022, a CTPE was done and was negative for PE. Was seen again in the ED on 12/02, CXR looked slightly worse, she completed a course of levaquin and Medrol pack. Partially improved today.     02/07/2023: Since the last visit, patient's breathing is mostly unchanged. No new chest pain, cough, sputum, fever, chills or night sweats. Complains mostly of joint pain. Had repeat CT done (results below).      03/07/2023: Since the last visit, patient's breathing has been improving. She was saw a rheumatologist and had ?lab work, and hw was started on Plaquenil. 02/16 she started the Plaquenil. Since the last visit, patient's breathing has been improving. CAT of 2.      06/06/2023: Since the last visit, patient's breathing is mostly unchanged. No new chest pain, cough, sputum, fever, chills or night sweats. Rheumatology increased her palquenil to 400mg. Had a fall and hurt her  left knee. Patient had repeat breathing test, 6MWT, CT scan done (results below).      08/29/2023: She is now up to 400mg for plaquenil and rheum added leflunomide. Since the last visit, patient's breathing is mostly unchanged. No new chest pain, cough, sputum, fever, chills or night sweats. Patient had repeat CT scan done (results below). she is still taking 5 mg of prednisone    12/5/2023: Since the last visit, patient's breathing is better. No hospital admissions or ED visits. No new chest pain, cough, sputum, fever, chills or night sweats. Patient active in everyday life goes on walks, climbs stairs, does participates in regimented exercise. Had sinus surgery in August which she thinks helped. Patient had repeat breathing test, 6MWT, CT scan done (results below). Patient compliant with prescribed inhalers using daily plaquenil 200mg once daily and leflulonimde 10mg daily and prednisone 2.5mg.    4/23/2024:  Since the last visit, patient's breathing is mostly unchanged. No hospital admissions or ED visits. No new chest pain, cough, sputum, fever, chills or night sweats. About 5 weeks ago she had knee swelling in her knee and he pred was increased to 20mg for 5 days and was decreased back to her home dose to 2.5mg. She is experiencing palpitations. She was prescribed metoprolol by ED attending. Patient had repeat breathing test, 6MWT (results below).      8/6/2024: Since the last visit, patient's breathing is mostly unchanged. Had another injection for her knee. Patient active in everyday life goes on walks, climbs stairs, does 54945 steps a day. No hospital admissions or ED visits. No new chest pain, cough, sputum, fever, chills or night sweats. She is on the 2.5mg of prednisone. She is also on the plaquenil and the leflunomide for her RA.  She saw cardiology for palpitations and was found to have NSR with ST. Patient had repeat breathing test, 6MWT (results below).     12/13/2024: Since the last visit, patient's  breathing is mostly unchanged. No hospital admissions or ED visits. No new chest pain, cough, sputum, fever, chills or night sweats. Walks 7-10K steps. Occasional cough which she attributes to her sinuses. She is taking palquenil, leflunomide. She stopped her prednisone in September.    02/28/2025:   Since the last visit, the patient’s breathing remains mostly unchanged. She has had no hospital admissions or emergency department visits and denies new chest pain, cough, sputum production, fever, chills, or night sweats.    A repeat chest CT in December showed increased ground-glass opacities (GGO), likely due to reactive organizing pneumonia. As a result, prednisone was restarted at 20 mg for two weeks, then tapered to 15 mg, and she is currently on 10 mg. She reports significant improvement in symptoms since restarting prednisone.    She has undergone a repeat breathing test and CT scan (results detailed below).    Previous pulmonary history:   She has no history of recurrent infections, or lung disease as a child. She had no previous lung hx, never on oxygen or inhaler therapy. She currently is on no supplemental oxygen.      Inhalers/nebulized medications: none     Hospitalization History:  She has not been hospitalized over the last year for breathing related problem.     Sleep history:  Denies snoring, apneas, feeling tired during the day or taking naps during the day. Does not feel tired during the day or take frequent naps.  STOP-BANG score of 2     Comorbidities:   ASCVD  anemia     SH:  smoking: never but second hand smoke from  of 50 years  drinking: none  illicit drug use: none     Occupation: (Full questionnaire on exposures obtained, discussed with the patient and scanned to EMR)  worked as RN  No known exposure to asbestos, silica or beryllium     Family History:  No family history of lung diseases or cancer     Imaging history: (I have personally reviewed the imaging below)  12/09/2024 HRCT with  increased perhilar GGO compared to July 2023 07/24/2023 HRCT with great improvement in inflammation and minimal residual   01/12/2023 HRCT with slight progression of GGO  10/24/2022 HRCT with great improvement but residual GGO in the mediastinal area with some progression of traction bronchiectasis  06/08/2022 (compared to 04/2022) HRCT with great improvement in GGO opacities with some residual  8/11/2021 space a CT cardiac scoring shows a groundglass density bilaterally     PFTs:  02/25/2025 -> Ratio of 0.86/FEV1 1.03L (63%)(no BD response)/FVC 1.2L (58%)/DLCO 59 ->116%  12/09/2024 -> Ratio of 0.88/FEV1 1.06L (65%)(no BD response)/FVC 1.21L (58%)/DLCO 51% ->104%   08/05/2024 -> Ratio of 0.88/FEV1 1.24L (75%) (no BD response)/FVC 1.4L (67%) /DLCO 65 -> 114%  04/15/2024 -> Ratio of 0.86/FEV1 1.10L (67%)(no BD response)/FVC 1.28L (61%)/TLC 50%/RVtoTLC ratio 0.53/DLCO 59%  05/30/2023 -> Ratio of 0.86/FEV1 1.23L (74%)(no BD response)/FVC 1.43L (64%)/TLC 54%/RVtoTLC ratio 0.36/DLCO 32%  10/24/2022 -> Ratio of 0.89/FEV1 1.25L (74%)(no BD response)/FVC 1.4L (62%)/DLCO 32 -> 52%  08/23/2022: -> Ratio of 0.9/FEV1 1.37L (81%) (no BD response)/FVC 1.53L (68%)/DLCO 37->60%  06/08/2022 -> FEV1/FVC ratio 0.89/FEV1 1.31L (77%) (no BD response)/FVC 1.48L (65%)     6 MWTs:  08/05/2024 ->on RA, 427m. Peak SpO2 of 99%. Reginald SpO2 96%.   04/15/2024 ->on RA, 446m. Peak SpO2 of 100%. Reginald SpO2 95%.   05/30/2023 ->on RA, 385m. Peak SpO2 of 100%. Reginald SpO2 90%.   10/24/2022 ->on RA, 448m. Peak SpO2 of 99%. Reginald SpO2 93%.   06/08/2022 ->on RA, 455m. Peak SpO2 of 100%. Reginald SpO2 93%.      Lung biopsy:   04/20/2022: BAL done but no differential or CD4/CD8 ratio reported. Lymph node biopsy with non-caseating granulomas. TBBx with organizing PNA.      Echo:   8/2021-> Normal EF 50-55%, mild aortic valve stenosis, moderate aortic valve cusp calcification, mild aortic valve regurgitation     Labs:  : Hb 15.3 , Eos <250, Bicarb 26, Creat  053        Review of Systems:   Pertinent positives and review of systems as noted above.  Remaining 10 review of systems is negative or noncontributory to today's episode of care.     Past Medical History:   Diagnosis Date    Personal history of other diseases of the respiratory system 12/21/2022    History of acute bronchitis        Medication Documentation Review Audit       Reviewed by Danna De Leon LPN (Licensed Nurse) on 11/21/24 at 0845      Medication Order Taking? Sig Documenting Provider Last Dose Status   calcium-vitamin D3-vitamin K 500 mg-1,000 unit-40 mcg tablet,chewable [id number] Yes Chew. Historical Provider, MD  Active   diclofenac sodium (Voltaren) 1 % gel [id number] Yes APPLY TOPICALLY EVERY 8 HOURS AS NEEDED Historical Provider, MD  Active   leflunomide (Arava) 10 mg tablet [id number] Yes  Historical Provider, MD  Active   lisinopril 5 mg tablet [id number] Yes Take 1 tablet (5 mg) by mouth once daily. ALEXANDRO Tobar-CNP  Active   methylPREDNISolone (Medrol Dospak) 4 mg tablets [id number] Yes  Historical Provider, MD  Active   PlaqueniL 200 mg tablet [id number] Yes Take 1 tablet (200 mg) by mouth once daily. Historical Provider, MD  Active     Discontinued 11/21/24 0833   rosuvastatin (Crestor) 10 mg tablet [id number] Yes TAKE 1 TABLET BY MOUTH EVERY DAY ALEXANDRO Noyola-CUCO  Active                 Physical Exam  Constitutional: Alert and oriented. In no acute distress. Well developed, well nourished.  Head and Face: Normal.   Oropharynx: normal.  Neck: No neck mass observed.  Pulmonary: Chest is normal to inspection. No increased work of breathing or signs of respiratory distress.   CV: No obvious peripheral edema.  MSK: normal gait and station. No clubbing or cyanosis of the fingernails.  Skin: Normal skin color and pigmentation, normal skin turgor, and no rash.  Neurologic: EOMI. Moving all 4 extremities.  Psychiatric: Intact judgement and insight.     Provider Impressions       #  Palpitation 4/23/2024:   he saw cardiology for palpitations and was found to have NSR with ST.     # Ground glass densities on CT consistent with ILD PFT restrictive  - Rheumatologic labs normal HP panel normal  04/20/2022: BAL done without infection. Lymph node biopsy with non-caseating granulomas. TBBx with organizing PNA.   -tapered off prednisone on October 7th 2022  (started on 05/14/2022 at 40mg)  She was in the emergency department on 11/02/2022, a CTPE was done and was negative for PE. Was seen again in the ED on 12/02, CXR looked slightly worse, she completed a course of levaquin and Medrol pack. Partially improved today   -Given CT progression will restart prednisone (02/07/2023) at 20mg and taper by 5mg every 2 weeks.  A repeat chest CT in December showed increased ground-glass opacities (GGO), likely due to reactive organizing pneumonia. As a result, prednisone was restarted at 20 mg for two weeks, then tapered to 15 mg, and she is currently on 10 mg. She reports significant improvement in symptoms since restarting prednisone. We will stay at 10mg as long as tolerated    # Osteopenia:   -noted on Dexa scan in 11/08/2023  -on calcium and vitamin  -since she is on chronic prednisone, will repeat testing now     # Chronic inflammatory joint pain:  -serologies done and negative in Nov 2021  -Saw rheumatology. Labs Pending. Started on Plaquenil     # Chronic sinusitis and deviated septum:  -Had surgery in August with Dr. Bustos from ENT       f/u in 3-4 months

## 2025-03-01 DIAGNOSIS — Z79.52 ON PREDNISONE THERAPY: ICD-10-CM

## 2025-03-01 DIAGNOSIS — J84.9 ILD (INTERSTITIAL LUNG DISEASE) (MULTI): ICD-10-CM

## 2025-03-03 RX ORDER — PREDNISONE 5 MG/1
TABLET ORAL
Qty: 120 TABLET | Refills: 1 | Status: SHIPPED | OUTPATIENT
Start: 2025-03-03 | End: 2025-05-30

## 2025-03-05 ENCOUNTER — APPOINTMENT (OUTPATIENT)
Dept: RADIOLOGY | Facility: HOSPITAL | Age: 75
End: 2025-03-05
Payer: MEDICARE

## 2025-03-05 ENCOUNTER — HOSPITAL ENCOUNTER (OUTPATIENT)
Dept: RADIOLOGY | Facility: HOSPITAL | Age: 75
Discharge: HOME | End: 2025-03-05
Payer: MEDICARE

## 2025-03-05 DIAGNOSIS — M85.89 OSTEOPENIA OF MULTIPLE SITES: ICD-10-CM

## 2025-03-05 PROCEDURE — 77080 DXA BONE DENSITY AXIAL: CPT

## 2025-03-06 ENCOUNTER — TELEPHONE (OUTPATIENT)
Dept: PULMONOLOGY | Facility: HOSPITAL | Age: 75
End: 2025-03-06
Payer: MEDICARE

## 2025-03-06 NOTE — TELEPHONE ENCOUNTER
Returned pt's call regarding her bone density scan results. Per Dr. Mccarthy, she has osteopenia but no osteoporosis, which calcium and vitamin D3 are recommended. Pt is already taking these medications, but should discuss with her PCP. Pt was updated on the results and verbalized understanding. She stated that she needed a FUV with Dr. Mccarthy in 3-4 months. Pt was scheduled 6/27/25 with Dr. Mccarthy. Pt confirmed date, time, and location. She stated that she has the number to schedule her PFT's and 6 minute walk test.

## 2025-03-07 ENCOUNTER — TELEPHONE (OUTPATIENT)
Dept: PRIMARY CARE | Facility: CLINIC | Age: 75
End: 2025-03-07
Payer: MEDICARE

## 2025-03-07 NOTE — TELEPHONE ENCOUNTER
Patient called in to request information and treatment for her dx of osteopenia. She states that she was encouraged to call her primary care office in regards to this. We spoke about the dx of osteopenia and her current treatment of supplements, along with the importance of bone strengthening exercise and future monitoring. Patient states understanding.

## 2025-03-24 DIAGNOSIS — S46.911D STRAIN OF RIGHT SHOULDER, SUBSEQUENT ENCOUNTER: Primary | ICD-10-CM

## 2025-03-29 DIAGNOSIS — E78.5 DYSLIPIDEMIA: ICD-10-CM

## 2025-03-31 RX ORDER — ROSUVASTATIN CALCIUM 10 MG/1
10 TABLET, COATED ORAL DAILY
Qty: 90 TABLET | Refills: 1 | Status: SHIPPED | OUTPATIENT
Start: 2025-03-31

## 2025-04-05 NOTE — TELEPHONE ENCOUNTER
LOV 4/15/24; DJD Left knee. Treated with CSI.  Patient called to speak with Dr. Fagan. States her knee for the last two weeks is really bothering her. Understands she is not a surgical candidate, but wished to know his plan. Requesting injection today. Explained to patient he is not in today. She requested one of us do it. Apologized to patient that we are unable and offered her appointment for next week. She declined. Asked for Dr. Fagan cell number. Apologized to her and again offered appointment for next week. She asked me to get a message to doctor that she called and is in pain.    PRINCIPAL PROCEDURE  Procedure: Laparoscopic appendectomy  Findings and Treatment:

## 2025-04-15 ENCOUNTER — HOSPITAL ENCOUNTER (OUTPATIENT)
Dept: MRI IMAGING | Age: 75
Discharge: HOME | End: 2025-04-17
Attending: ORTHOPAEDIC SURGERY
Payer: MEDICARE

## 2025-04-15 DIAGNOSIS — S46.911D STRAIN OF RIGHT SHOULDER, SUBSEQUENT ENCOUNTER: ICD-10-CM

## 2025-04-15 PROCEDURE — 73221 MRI JOINT UPR EXTREM W/O DYE: CPT

## 2025-04-24 ENCOUNTER — OFFICE VISIT (OUTPATIENT)
Dept: ORTHOPEDIC SURGERY | Age: 75
End: 2025-04-24
Payer: MEDICARE

## 2025-04-24 DIAGNOSIS — S46.011A TRAUMATIC COMPLETE TEAR OF RIGHT ROTATOR CUFF, INITIAL ENCOUNTER: Primary | ICD-10-CM

## 2025-04-24 PROCEDURE — G8420 CALC BMI NORM PARAMETERS: HCPCS | Performed by: ORTHOPAEDIC SURGERY

## 2025-04-24 PROCEDURE — 99213 OFFICE O/P EST LOW 20 MIN: CPT | Performed by: ORTHOPAEDIC SURGERY

## 2025-04-24 PROCEDURE — G8399 PT W/DXA RESULTS DOCUMENT: HCPCS | Performed by: ORTHOPAEDIC SURGERY

## 2025-04-24 PROCEDURE — G8427 DOCREV CUR MEDS BY ELIG CLIN: HCPCS | Performed by: ORTHOPAEDIC SURGERY

## 2025-04-24 PROCEDURE — 1036F TOBACCO NON-USER: CPT | Performed by: ORTHOPAEDIC SURGERY

## 2025-04-24 PROCEDURE — 20610 DRAIN/INJ JOINT/BURSA W/O US: CPT | Performed by: ORTHOPAEDIC SURGERY

## 2025-04-24 PROCEDURE — 1090F PRES/ABSN URINE INCON ASSESS: CPT | Performed by: ORTHOPAEDIC SURGERY

## 2025-04-24 PROCEDURE — 3017F COLORECTAL CA SCREEN DOC REV: CPT | Performed by: ORTHOPAEDIC SURGERY

## 2025-04-24 PROCEDURE — 1123F ACP DISCUSS/DSCN MKR DOCD: CPT | Performed by: ORTHOPAEDIC SURGERY

## 2025-04-24 PROCEDURE — 1159F MED LIST DOCD IN RCRD: CPT | Performed by: ORTHOPAEDIC SURGERY

## 2025-04-24 RX ORDER — TRIAMCINOLONE ACETONIDE 40 MG/ML
40 INJECTION, SUSPENSION INTRA-ARTICULAR; INTRAMUSCULAR ONCE
Status: COMPLETED | OUTPATIENT
Start: 2025-04-24 | End: 2025-04-24

## 2025-04-24 RX ADMIN — TRIAMCINOLONE ACETONIDE 40 MG: 40 INJECTION, SUSPENSION INTRA-ARTICULAR; INTRAMUSCULAR at 08:09

## 2025-04-24 NOTE — PROGRESS NOTES
Chief Complaint   Patient presents with    Shoulder Pain     Right Shoulder: Pt expressed having increased pain in the R Shoulder. Pt described a pulling sensation in the R Shoulder. Pt has 5/10 pain. Pt has decreased ability to perform ADL's due to symptoms. Pt has increased sleep disturbances due to symptom burden. Pt has been overcompensating with the L UE due to inability to perform ADL's with the R. Pt described having altered sensation in the R UE that moves distally into the R Hand and Fingers         Sharita Askew is a 74 y.o. year old   female who is seen today for follow up of right shoulder. She stated she has been dealing with shoulder pain for about 1 year. She did PT which helped her function but she still complains of pain.       Chief Complaint   Patient presents with    Shoulder Pain     Right Shoulder: Pt expressed having increased pain in the R Shoulder. Pt described a pulling sensation in the R Shoulder. Pt has 5/10 pain. Pt has decreased ability to perform ADL's due to symptoms. Pt has increased sleep disturbances due to symptom burden. Pt has been overcompensating with the L UE due to inability to perform ADL's with the R. Pt described having altered sensation in the R UE that moves distally into the R Hand and Fingers      Past Medical History:   Diagnosis Date    MRSA infection      Past Surgical History:   Procedure Laterality Date    BACK SURGERY      BONE INCISION AND DRAINAGE  4/19/2011    left knee    DILATION AND CURETTAGE OF UTERUS      HYSTERECTOMY (CERVIX STATUS UNKNOWN)         Current Outpatient Medications:     diclofenac sodium (VOLTAREN) 1 % GEL, APPLY TOPICALLY EVERY 8 HOURS AS NEEDED, Disp: , Rfl:     lisinopril (PRINIVIL;ZESTRIL) 5 MG tablet, Take 1 tablet by mouth daily, Disp: , Rfl:     leflunomide (ARAVA) 10 MG tablet, , Disp: , Rfl:     predniSONE (DELTASONE) 5 MG tablet, Take 0.5 tablets by mouth daily (Patient not taking: Reported on 11/7/2024), Disp: ,

## 2025-05-13 ENCOUNTER — OFFICE VISIT (OUTPATIENT)
Dept: ORTHOPEDIC SURGERY | Age: 75
End: 2025-05-13
Payer: MEDICARE

## 2025-05-13 VITALS — BODY MASS INDEX: 23.09 KG/M2 | HEIGHT: 58 IN | WEIGHT: 110 LBS | TEMPERATURE: 98.6 F

## 2025-05-13 DIAGNOSIS — S46.011A TRAUMATIC COMPLETE TEAR OF RIGHT ROTATOR CUFF, INITIAL ENCOUNTER: Primary | ICD-10-CM

## 2025-05-13 DIAGNOSIS — M19.011 GLENOHUMERAL ARTHRITIS, RIGHT: ICD-10-CM

## 2025-05-13 PROCEDURE — 1036F TOBACCO NON-USER: CPT | Performed by: ORTHOPAEDIC SURGERY

## 2025-05-13 PROCEDURE — 1123F ACP DISCUSS/DSCN MKR DOCD: CPT | Performed by: ORTHOPAEDIC SURGERY

## 2025-05-13 PROCEDURE — 1090F PRES/ABSN URINE INCON ASSESS: CPT | Performed by: ORTHOPAEDIC SURGERY

## 2025-05-13 PROCEDURE — 1125F AMNT PAIN NOTED PAIN PRSNT: CPT | Performed by: ORTHOPAEDIC SURGERY

## 2025-05-13 PROCEDURE — G8399 PT W/DXA RESULTS DOCUMENT: HCPCS | Performed by: ORTHOPAEDIC SURGERY

## 2025-05-13 PROCEDURE — 1159F MED LIST DOCD IN RCRD: CPT | Performed by: ORTHOPAEDIC SURGERY

## 2025-05-13 PROCEDURE — 99214 OFFICE O/P EST MOD 30 MIN: CPT | Performed by: ORTHOPAEDIC SURGERY

## 2025-05-13 PROCEDURE — 3017F COLORECTAL CA SCREEN DOC REV: CPT | Performed by: ORTHOPAEDIC SURGERY

## 2025-05-13 PROCEDURE — G8420 CALC BMI NORM PARAMETERS: HCPCS | Performed by: ORTHOPAEDIC SURGERY

## 2025-05-13 PROCEDURE — G8427 DOCREV CUR MEDS BY ELIG CLIN: HCPCS | Performed by: ORTHOPAEDIC SURGERY

## 2025-05-13 NOTE — PROGRESS NOTES
Chief Complaint   Patient presents with    Shoulder Pain     Right shoulder f/u. Patient states pain is persistent and unchanged since last visit. Pain is described as tight. States arm feels heavy. Patient is RHD. Pain level today 6/10        Sharita Askew is a 74 y.o. year old   female who is seen today for follow up of right shoulder. She stated she had injection done with no relief. Her pain level is 6/10. She has been dealing with shoulder pain for at least a year.      Chief Complaint   Patient presents with    Shoulder Pain     Right shoulder f/u. Patient states pain is persistent and unchanged since last visit. Pain is described as tight. States arm feels heavy. Patient is RHD. Pain level today 6/10     Past Medical History:   Diagnosis Date    MRSA infection      Past Surgical History:   Procedure Laterality Date    BACK SURGERY      BONE INCISION AND DRAINAGE  4/19/2011    left knee    DILATION AND CURETTAGE OF UTERUS      HYSTERECTOMY (CERVIX STATUS UNKNOWN)         Current Outpatient Medications:     diclofenac sodium (VOLTAREN) 1 % GEL, APPLY TOPICALLY EVERY 8 HOURS AS NEEDED, Disp: , Rfl:     lisinopril (PRINIVIL;ZESTRIL) 5 MG tablet, Take 1 tablet by mouth daily, Disp: , Rfl:     leflunomide (ARAVA) 10 MG tablet, , Disp: , Rfl:     predniSONE (DELTASONE) 5 MG tablet, Take 0.5 tablets by mouth daily, Disp: , Rfl:     hydroxychloroquine (PLAQUENIL) 200 MG tablet, Take 1 tablet by mouth daily, Disp: , Rfl:     rosuvastatin (CRESTOR) 10 MG tablet, Take by mouth, Disp: , Rfl:     Multiple Vitamin (THERA) TABS, Take  by mouth.  , Disp: , Rfl:   Allergies   Allergen Reactions    Demerol      Social History     Socioeconomic History    Marital status:      Spouse name: Not on file    Number of children: Not on file    Years of education: Not on file    Highest education level: Not on file   Occupational History    Not on file   Tobacco Use    Smoking status: Never    Smokeless tobacco:

## 2025-05-14 DIAGNOSIS — S46.011A TRAUMATIC COMPLETE TEAR OF RIGHT ROTATOR CUFF, INITIAL ENCOUNTER: Primary | ICD-10-CM

## 2025-05-14 DIAGNOSIS — M19.011 GLENOHUMERAL ARTHRITIS, RIGHT: ICD-10-CM

## 2025-05-15 ENCOUNTER — TELEPHONE (OUTPATIENT)
Dept: PULMONOLOGY | Facility: HOSPITAL | Age: 75
End: 2025-05-15
Payer: MEDICARE

## 2025-05-15 NOTE — TELEPHONE ENCOUNTER
Pt called stating that she has shoulder surgery on 6/18/25 with Dr. Alexander. She stated that she needs surgical clearance from Dr. Mccarthy and tried to send the office the form through [employer] message. However, the form is not found. This nurse called Dr. Alexander office and they will fax the form to Dr. Mccarthy's office.

## 2025-05-22 ENCOUNTER — PREP FOR PROCEDURE (OUTPATIENT)
Dept: ORTHOPEDIC SURGERY | Age: 75
End: 2025-05-22

## 2025-05-22 PROBLEM — M19.011 DJD OF RIGHT SHOULDER: Status: ACTIVE | Noted: 2025-05-22

## 2025-06-02 ENCOUNTER — APPOINTMENT (OUTPATIENT)
Dept: PRIMARY CARE | Facility: CLINIC | Age: 75
End: 2025-06-02
Payer: MEDICARE

## 2025-06-02 VITALS
WEIGHT: 110.7 LBS | HEART RATE: 79 BPM | SYSTOLIC BLOOD PRESSURE: 154 MMHG | DIASTOLIC BLOOD PRESSURE: 85 MMHG | OXYGEN SATURATION: 97 % | BODY MASS INDEX: 23.14 KG/M2

## 2025-06-02 DIAGNOSIS — I10 PRIMARY HYPERTENSION: ICD-10-CM

## 2025-06-02 DIAGNOSIS — Z13.1 SCREENING FOR DIABETES MELLITUS (DM): ICD-10-CM

## 2025-06-02 DIAGNOSIS — I35.0 NONRHEUMATIC AORTIC VALVE STENOSIS: ICD-10-CM

## 2025-06-02 DIAGNOSIS — J84.9 INTERSTITIAL LUNG DISEASE (MULTI): ICD-10-CM

## 2025-06-02 DIAGNOSIS — M05.79 RHEUMATOID ARTHRITIS WITH RHEUMATOID FACTOR OF MULTIPLE SITES WITHOUT ORGAN OR SYSTEMS INVOLVEMENT (MULTI): ICD-10-CM

## 2025-06-02 DIAGNOSIS — M19.90 CHRONIC INFLAMMATORY ARTHRITIS: ICD-10-CM

## 2025-06-02 DIAGNOSIS — E78.2 MIXED HYPERLIPIDEMIA: ICD-10-CM

## 2025-06-02 DIAGNOSIS — M75.101 TEAR OF RIGHT ROTATOR CUFF, UNSPECIFIED TEAR EXTENT, UNSPECIFIED WHETHER TRAUMATIC: Primary | ICD-10-CM

## 2025-06-02 DIAGNOSIS — Z01.818 PRE-OPERATIVE CLEARANCE: ICD-10-CM

## 2025-06-02 PROCEDURE — 1125F AMNT PAIN NOTED PAIN PRSNT: CPT

## 2025-06-02 PROCEDURE — 1036F TOBACCO NON-USER: CPT

## 2025-06-02 PROCEDURE — 1159F MED LIST DOCD IN RCRD: CPT

## 2025-06-02 PROCEDURE — 1160F RVW MEDS BY RX/DR IN RCRD: CPT

## 2025-06-02 PROCEDURE — 99214 OFFICE O/P EST MOD 30 MIN: CPT

## 2025-06-02 PROCEDURE — 3077F SYST BP >= 140 MM HG: CPT

## 2025-06-02 PROCEDURE — 3079F DIAST BP 80-89 MM HG: CPT

## 2025-06-02 RX ORDER — PREDNISONE 10 MG/1
10 TABLET ORAL DAILY
COMMUNITY

## 2025-06-02 ASSESSMENT — ENCOUNTER SYMPTOMS
COUGH: 0
COLOR CHANGE: 1
SHORTNESS OF BREATH: 0
DIARRHEA: 0
ARTHRALGIAS: 1
CHILLS: 0
FATIGUE: 0
NAUSEA: 0
WHEEZING: 0
HEMATOLOGIC/LYMPHATIC NEGATIVE: 1
DYSURIA: 0
CARDIOVASCULAR NEGATIVE: 1
NEUROLOGICAL NEGATIVE: 1
PALPITATIONS: 0
NERVOUS/ANXIOUS: 1
CONSTIPATION: 1
ALLERGIC/IMMUNOLOGIC NEGATIVE: 1
RESPIRATORY NEGATIVE: 1
FEVER: 0
VOMITING: 0
CONSTITUTIONAL NEGATIVE: 1

## 2025-06-02 ASSESSMENT — PATIENT HEALTH QUESTIONNAIRE - PHQ9
1. LITTLE INTEREST OR PLEASURE IN DOING THINGS: NOT AT ALL
SUM OF ALL RESPONSES TO PHQ9 QUESTIONS 1 AND 2: 0
2. FEELING DOWN, DEPRESSED OR HOPELESS: NOT AT ALL

## 2025-06-02 ASSESSMENT — PAIN SCALES - GENERAL: PAINLEVEL_OUTOF10: 5

## 2025-06-02 NOTE — PROGRESS NOTES
"Subjective   Patient ID: Aileen Perea \"Naz Sandhu" is a 74 y.o. female who presents for Follow-up (Medical clearance ).  Today she is accompanied by alone.     DEVIN Gallagher is here today for medical clearance for surgery.  She is scheduled for surgery 6/18/25 with Dr Alexander at [facility] in [facility]    Traumatic complete tear of right rotator cuff   - scheduled for surgery  6/18/25 with Dr Alexander  - has had right shoulder pain x 1 year, had steroid injections without relief, completed PT  - she fell after dog drug her to the ground  - 4/25/25 MRI Right shoulder:   Complete supraspinatus tendon tear with 3.8 cm retraction of torn fibers and  mild muscle atrophy.    Full-thickness subscapularis tendon tear. Tendon fibers appear  thin/attenuated but not significantly retracted.    Partial articular sided tearing of the distal anterior infraspinatus tendon.    Small joint effusion.    Degenerative changes.       Hypertension  - controlled, feels anxious at doctor appointments  - /85  - Goal < 130/80  - Current medication: Lisinopril 5 mg daily    Hyperlipidemia  - controlled  - last lipid 11/2024   Cholesterol 186   LDL 77   HdL 77   Triglycerides 160  - current medication: Crestor 10 mg    RA  Chronic inflammatory joint pain:  -serologies done and negative in Nov 2021  -Saw rheumatology.   -current medication:  Plaquenil 200 mg daily, Voltaren gel, Leflunomide 10 mg    Interstitial lung disease  - following with pulmonary   - Patient had a CT cardiac scoring which did show bilateral groundglass densities. She also had a chest x-ray which showed the same   - A repeat chest CT in December 2024 showed increased ground-glass opacities (GGO), likely due to reactive organizing pneumonia. As a result, prednisone was restarted at 20 mg for two weeks, then tapered to 15 mg, and she is currently on 10 mg. She reports significant improvement in symptoms since restarting prednisone.   - current medication: Prednisone 10 " mg    Aortic stenosis  - following with cardiology  - had some palpations is past, no chest pain or SOB    Echocardiogram 6/2024  CONCLUSIONS:   1. The left ventricular systolic function is normal, with a visually estimated ejection fraction of 55-60%.   2. Spectral Doppler shows an impaired relaxation pattern of left ventricular diastolic filling.   3. Mild to moderate aortic valve stenosis.   4. Mild aortic valve regurgitation.      Echocardiogram 8/11/2021  CONCLUSIONS:  1. The left ventricular systolic function is normal with a 50-55% estimated ejection fraction.  2. Spectral Doppler shows an impaired relaxation pattern of left ventricular diastolic filling.  3. Mild aortic valve stenosis.  4. There is moderate aortic valve cusp calcification.  5. There is mild aortic valve regurgitation.        CT HEART CALCIUM SCORING 8/11/2021  Impression  1. Coronary artery calcium score of 10.34*.  2. Grossly abnormal lung parenchyma with predominant ground-glass  density bilaterally. No priors are available to determine degree of  acuity. Findings may relate to acute pneumonitis, edema, or  hemorrhage. Finding may also be seen in chronic idiopathic  pneumonias. Clinical correlation needed.    Review of Systems   Constitutional: Negative.  Negative for chills, fatigue and fever.   HENT: Negative.  Negative for congestion.    Respiratory: Negative.  Negative for cough, shortness of breath and wheezing.    Cardiovascular: Negative.  Negative for chest pain, palpitations and leg swelling.   Gastrointestinal:  Positive for constipation. Negative for diarrhea, nausea and vomiting.   Genitourinary: Negative.  Negative for dysuria and urgency.   Musculoskeletal:  Positive for arthralgias.   Skin:  Positive for color change (right foot first three toes with purple buising under nail, send and third tow are longer thn first toe... this is likely related to nail trauma from shoe).   Allergic/Immunologic: Negative.    Neurological:  Negative.    Hematological: Negative.    Psychiatric/Behavioral:  The patient is nervous/anxious.        Objective   /85 (BP Location: Left arm)   Pulse 79   Wt 50.2 kg (110 lb 11.2 oz)   SpO2 97%   BMI 23.14 kg/m²   BSA: 1.43 meters squared  Growth percentiles: Facility age limit for growth %macario is 20 years. Facility age limit for growth %macario is 20 years.     Physical Exam  Vitals and nursing note reviewed.   Constitutional:       Appearance: Normal appearance. She is normal weight.   HENT:      Head: Normocephalic.      Nose: Nose normal.      Mouth/Throat:      Mouth: Mucous membranes are moist.      Pharynx: Oropharynx is clear.   Eyes:      Extraocular Movements: Extraocular movements intact.      Conjunctiva/sclera: Conjunctivae normal.      Pupils: Pupils are equal, round, and reactive to light.   Cardiovascular:      Rate and Rhythm: Normal rate and regular rhythm.      Pulses: Normal pulses.   Pulmonary:      Effort: Pulmonary effort is normal. No respiratory distress.      Breath sounds: Normal breath sounds. No wheezing, rhonchi or rales.   Abdominal:      General: Abdomen is flat. Bowel sounds are normal.      Palpations: Abdomen is soft.   Musculoskeletal:         General: Normal range of motion.      Cervical back: Normal range of motion and neck supple.      Right lower leg: No edema.      Left lower leg: No edema.   Skin:     General: Skin is warm and dry.      Capillary Refill: Capillary refill takes less than 2 seconds.   Neurological:      General: No focal deficit present.      Mental Status: She is alert. Mental status is at baseline.   Psychiatric:         Mood and Affect: Mood normal.         Behavior: Behavior normal.         Thought Content: Thought content normal.         Judgment: Judgment normal.       /85 (BP Location: Left arm)   Pulse 79   Wt 50.2 kg (110 lb 11.2 oz)   SpO2 97%   BMI 23.14 kg/m²    Lab Results   Component Value Date    GLUCOSE 92 11/21/2024     CALCIUM 9.6 11/21/2024     11/21/2024    K 3.5 11/21/2024    CO2 28 11/21/2024     11/21/2024    BUN 14 11/21/2024    CREATININE 0.58 11/21/2024        Assessment/Plan   Traumatic complete tear of right rotator cuff   - scheduled for surgery  6/18/25 with Dr Alexander  - has had right shoulder pain x 1 year, had steroid injections without relief, completed PT  - she fell after dog drug her to the ground    Hypertension  - controlled  - /85  she get anxious at doctor appointments  - Goal < 130/80  - Current medication: Lisinopril 5 mg daily    Hyperlipidemia  - controlled  - last lipid 11/2024   Cholesterol 186   LDL 77   HdL 77   Triglycerides 160  - current medication: Crestor 10 mg    RA  Chronic inflammatory joint pain:  -serologies done and negative in Nov 2021  -Saw rheumatology.  -current medication:  Plaquenil 200 mg daily, Voltaren gel, Leflunomide 10 mg    Interstitial lung disease  - following with pulmonary   - Patient had a CT cardiac scoring which did show bilateral groundglass densities. She also had a chest x-ray which showed the same   - A repeat chest CT in December 2024 showed increased ground-glass opacities (GGO), likely due to reactive organizing pneumonia. As a result, prednisone was restarted at 20 mg for two weeks, then tapered to 15 mg, and she is currently on 10 mg. She reports significant improvement in symptoms since restarting prednisone.   - current medication: Prednisone 10 mg    Aortic stenosis  - following with cardiology  - had some palpations is past, no chest pain or SOB    Echocardiogram 6/2024  CONCLUSIONS:   1. The left ventricular systolic function is normal, with a visually estimated ejection fraction of 55-60%.   2. Spectral Doppler shows an impaired relaxation pattern of left ventricular diastolic filling.   3. Mild to moderate aortic valve stenosis.   4. Mild aortic valve regurgitation.        Problem List Items Addressed This Visit       Interstitial lung  disease (Multi)    Relevant Orders    CBC and Auto Differential    Comprehensive metabolic panel    Hemoglobin A1C    Urinalysis with Reflex Microscopic    Nonrheumatic aortic valve stenosis    Primary hypertension    Relevant Orders    CBC and Auto Differential    Comprehensive metabolic panel    Hemoglobin A1C    Urinalysis with Reflex Microscopic    Rheumatoid arthritis with rheumatoid factor of multiple sites without organ or systems involvement (Multi)     Other Visit Diagnoses         Tear of right rotator cuff, unspecified tear extent, unspecified whether traumatic    -  Primary    Relevant Orders    CBC and Auto Differential    Comprehensive metabolic panel    Hemoglobin A1C    Urinalysis with Reflex Microscopic      Mixed hyperlipidemia        Relevant Orders    CBC and Auto Differential    Comprehensive metabolic panel    Hemoglobin A1C    Urinalysis with Reflex Microscopic      Chronic inflammatory arthritis        Relevant Orders    CBC and Auto Differential    Comprehensive metabolic panel    Hemoglobin A1C    Urinalysis with Reflex Microscopic      Screening for diabetes mellitus (DM)        Relevant Orders    Hemoglobin A1C      Pre-operative clearance        Relevant Orders    ECG 12 lead (Ancillary Performed)        It was great to see you today!  Please continue taking your prescribed medications.   Refill have been sent to your pharmacy.    I have ordered some labs to be done as soon as you can.  We will call you with the results.    Please get EKG    RTC in 6 months

## 2025-06-10 ENCOUNTER — TELEPHONE (OUTPATIENT)
Dept: PULMONOLOGY | Facility: HOSPITAL | Age: 75
End: 2025-06-10
Payer: MEDICARE

## 2025-06-10 RX ORDER — SODIUM CHLORIDE, SODIUM LACTATE, POTASSIUM CHLORIDE, CALCIUM CHLORIDE 600; 310; 30; 20 MG/100ML; MG/100ML; MG/100ML; MG/100ML
INJECTION, SOLUTION INTRAVENOUS CONTINUOUS
OUTPATIENT
Start: 2025-06-10

## 2025-06-10 NOTE — TELEPHONE ENCOUNTER
Returned pt's call regarding her breathing tests. Pt stated that her follow up with Dr. Mccarthy was rescheduled to August. Pt wanted to know if she should keep her appointment for breathing tests on Monday or reschedule those as well. Pt was informed that she can reschedule if she would like or keep the visit. It was explained that if she completes the breathing tests on Monday, Dr. Mccarthy will discuss the results with her at her next appointment in August. Pt verbalized understanding and decided to complete testing on Monday.

## 2025-06-11 ENCOUNTER — HOSPITAL ENCOUNTER (OUTPATIENT)
Dept: GENERAL RADIOLOGY | Age: 75
Discharge: HOME | End: 2025-06-13
Payer: MEDICARE

## 2025-06-11 ENCOUNTER — HOSPITAL ENCOUNTER (OUTPATIENT)
Dept: PREADMISSION TESTING | Age: 75
Discharge: HOME | End: 2025-06-11
Payer: MEDICARE

## 2025-06-11 VITALS
RESPIRATION RATE: 18 BRPM | BODY MASS INDEX: 23.09 KG/M2 | HEIGHT: 58 IN | DIASTOLIC BLOOD PRESSURE: 92 MMHG | HEART RATE: 100 BPM | SYSTOLIC BLOOD PRESSURE: 180 MMHG | WEIGHT: 110 LBS | OXYGEN SATURATION: 96 % | TEMPERATURE: 97.4 F

## 2025-06-11 DIAGNOSIS — Z01.818 PRE-OP EVALUATION: ICD-10-CM

## 2025-06-11 LAB
ALBUMIN SERPL-MCNC: 4.6 G/DL (ref 3.5–5.2)
ALP SERPL-CCNC: 58 U/L (ref 35–104)
ALT SERPL-CCNC: 33 U/L (ref 0–35)
ANION GAP SERPL CALCULATED.3IONS-SCNC: 12 MMOL/L (ref 7–16)
AST SERPL-CCNC: 36 U/L (ref 0–35)
BASOPHILS # BLD: 0.02 K/UL (ref 0–0.2)
BASOPHILS NFR BLD: 0 % (ref 0–2)
BILIRUB SERPL-MCNC: 0.7 MG/DL (ref 0–1.2)
BILIRUB UR QL STRIP: NEGATIVE
BUN SERPL-MCNC: 11 MG/DL (ref 8–23)
CALCIUM SERPL-MCNC: 9.3 MG/DL (ref 8.8–10.2)
CHLORIDE SERPL-SCNC: 104 MMOL/L (ref 98–107)
CLARITY UR: CLEAR
CO2 SERPL-SCNC: 25 MMOL/L (ref 22–29)
COLOR UR: YELLOW
COMMENT: NORMAL
CREAT SERPL-MCNC: 0.6 MG/DL (ref 0.5–1)
EOSINOPHIL # BLD: 0.14 K/UL (ref 0.05–0.5)
EOSINOPHILS RELATIVE PERCENT: 2 % (ref 0–6)
ERYTHROCYTE [DISTWIDTH] IN BLOOD BY AUTOMATED COUNT: 12.9 % (ref 11.5–15)
GFR, ESTIMATED: >90 ML/MIN/1.73M2
GLUCOSE SERPL-MCNC: 113 MG/DL (ref 74–99)
GLUCOSE UR STRIP-MCNC: NEGATIVE MG/DL
HBA1C MFR BLD: 5.9 % (ref 4–5.6)
HCT VFR BLD AUTO: 42.6 % (ref 34–48)
HGB BLD-MCNC: 13.9 G/DL (ref 11.5–15.5)
HGB UR QL STRIP.AUTO: NEGATIVE
IMM GRANULOCYTES # BLD AUTO: <0.03 K/UL (ref 0–0.58)
IMM GRANULOCYTES NFR BLD: 0 % (ref 0–5)
INR PPP: 1
KETONES UR STRIP-MCNC: NEGATIVE MG/DL
LEUKOCYTE ESTERASE UR QL STRIP: NEGATIVE
LYMPHOCYTES NFR BLD: 1.26 K/UL (ref 1.5–4)
LYMPHOCYTES RELATIVE PERCENT: 17 % (ref 20–42)
MCH RBC QN AUTO: 30.4 PG (ref 26–35)
MCHC RBC AUTO-ENTMCNC: 32.6 G/DL (ref 32–34.5)
MCV RBC AUTO: 93.2 FL (ref 80–99.9)
MONOCYTES NFR BLD: 0.58 K/UL (ref 0.1–0.95)
MONOCYTES NFR BLD: 8 % (ref 2–12)
NEUTROPHILS NFR BLD: 73 % (ref 43–80)
NEUTS SEG NFR BLD: 5.48 K/UL (ref 1.8–7.3)
NITRITE UR QL STRIP: NEGATIVE
PARTIAL THROMBOPLASTIN TIME: 28.4 SEC (ref 24.5–35.1)
PH UR STRIP: 6 [PH] (ref 5–8)
PLATELET # BLD AUTO: 278 K/UL (ref 130–450)
PMV BLD AUTO: 9.4 FL (ref 7–12)
POTASSIUM SERPL-SCNC: 3.3 MMOL/L (ref 3.5–5.1)
PREALB SERPL-MCNC: 37.8 MG/DL (ref 20–40)
PROT SERPL-MCNC: 7.4 G/DL (ref 6.4–8.3)
PROT UR STRIP-MCNC: NEGATIVE MG/DL
PROTHROMBIN TIME: 10.4 SEC (ref 9.3–12.4)
RBC # BLD AUTO: 4.57 M/UL (ref 3.5–5.5)
SODIUM SERPL-SCNC: 141 MMOL/L (ref 136–145)
SP GR UR STRIP: 1.01 (ref 1–1.03)
UROBILINOGEN UR STRIP-ACNC: 0.2 EU/DL (ref 0–1)
WBC OTHER # BLD: 7.5 K/UL (ref 4.5–11.5)

## 2025-06-11 PROCEDURE — 71046 X-RAY EXAM CHEST 2 VIEWS: CPT

## 2025-06-11 PROCEDURE — 83036 HEMOGLOBIN GLYCOSYLATED A1C: CPT

## 2025-06-11 PROCEDURE — 80053 COMPREHEN METABOLIC PANEL: CPT

## 2025-06-11 PROCEDURE — 87081 CULTURE SCREEN ONLY: CPT

## 2025-06-11 PROCEDURE — 84134 ASSAY OF PREALBUMIN: CPT

## 2025-06-11 PROCEDURE — 85610 PROTHROMBIN TIME: CPT

## 2025-06-11 PROCEDURE — 93005 ELECTROCARDIOGRAM TRACING: CPT

## 2025-06-11 PROCEDURE — 81003 URINALYSIS AUTO W/O SCOPE: CPT

## 2025-06-11 PROCEDURE — 85025 COMPLETE CBC W/AUTO DIFF WBC: CPT

## 2025-06-11 PROCEDURE — 87086 URINE CULTURE/COLONY COUNT: CPT

## 2025-06-11 PROCEDURE — 85730 THROMBOPLASTIN TIME PARTIAL: CPT

## 2025-06-11 ASSESSMENT — PAIN DESCRIPTION - ONSET: ONSET: ON-GOING

## 2025-06-11 ASSESSMENT — PAIN DESCRIPTION - FREQUENCY: FREQUENCY: CONTINUOUS

## 2025-06-11 ASSESSMENT — PAIN DESCRIPTION - ORIENTATION: ORIENTATION: RIGHT

## 2025-06-11 ASSESSMENT — PAIN DESCRIPTION - PAIN TYPE: TYPE: CHRONIC PAIN

## 2025-06-11 ASSESSMENT — PAIN DESCRIPTION - DESCRIPTORS: DESCRIPTORS: DISCOMFORT

## 2025-06-11 ASSESSMENT — PAIN DESCRIPTION - LOCATION: LOCATION: SHOULDER

## 2025-06-11 ASSESSMENT — PAIN SCALES - GENERAL: PAINLEVEL_OUTOF10: 5

## 2025-06-11 NOTE — PROGRESS NOTES
Spoke with shivam at dr veag office, she will fax clearance form to dr teixeira office for pulmonary clearance.  Faxed cmp, hga1c results to dr vega and dr navas offices, confirmations received. Notified dr galvin of potassium results, will repeat level day of surgery.  
cancelled if you do not have a ride home or someone to stay with you.    Please wear simple, loose fitting clothing to the hospital.  Do not bring valuables (money, credit cards, checkbooks, etc.) Do not wear any makeup (including no eye makeup) or nail polish on your fingers or toes.    DO NOT wear any jewelry or piercings on day of surgery.  All body piercings and jewelry must be removed.    Shower the MORNING of surgery with an Antibacterial soap.    If you have a Living Will and/or Durable Power of  for Healthcare, please bring in a copy.    If appropriate bring crutches, inspirex, walker, cane etc...    Notify your Surgeon if you develop any illness between now and surgery time, cough, cold, fever, sore throat, nausea, vomiting, etc.  Please notify your surgeon if you experience dizziness, shortness of breath or blurred vision between now & the time of your surgery.    If you wear dentures, they will need to be removed before going into surgery, we will provide you with a container. If you wear contact lenses or glasses, they will need to be removed, please bring a case for them.    To provide excellent care visitors will be limited to 1 person in the room at any given time.                                                                                         No children under the age of 16 are permitted in the hospital for the safety of all patients.     Any implantable device requiring remote therapy, Please bring remote day of surgery and bring your implant card with you!      21. If you use a C-PAP please bring settings with you, do not bring the machine.    22. Other:                     Please call AMBULATORY CARE if you have any further questions.   Pre Admit Testing           [phone]     [facility] [phone]

## 2025-06-12 DIAGNOSIS — E87.6 HYPOKALEMIA: Primary | ICD-10-CM

## 2025-06-12 LAB
EKG ATRIAL RATE: 82 BPM
EKG P AXIS: 7 DEGREES
EKG P-R INTERVAL: 110 MS
EKG Q-T INTERVAL: 372 MS
EKG QRS DURATION: 86 MS
EKG QTC CALCULATION (BAZETT): 434 MS
EKG R AXIS: -19 DEGREES
EKG T AXIS: -7 DEGREES
EKG VENTRICULAR RATE: 82 BPM
MICROORGANISM SPEC CULT: ABNORMAL
SERVICE CMNT-IMP: ABNORMAL
SPECIMEN DESCRIPTION: ABNORMAL

## 2025-06-12 RX ORDER — POTASSIUM CHLORIDE 20 MEQ/1
20 TABLET, EXTENDED RELEASE ORAL 2 TIMES DAILY
Qty: 4 TABLET | Refills: 0 | Status: SHIPPED | OUTPATIENT
Start: 2025-06-12 | End: 2025-06-14

## 2025-06-13 LAB
MICROORGANISM SPEC CULT: NORMAL
SERVICE CMNT-IMP: NORMAL
SPECIMEN DESCRIPTION: NORMAL

## 2025-06-16 ENCOUNTER — HOSPITAL ENCOUNTER (OUTPATIENT)
Dept: RESPIRATORY THERAPY | Facility: HOSPITAL | Age: 75
Discharge: HOME | End: 2025-06-16
Payer: MEDICARE

## 2025-06-16 DIAGNOSIS — J84.9 ILD (INTERSTITIAL LUNG DISEASE) (MULTI): ICD-10-CM

## 2025-06-16 PROCEDURE — 2500000002 HC RX 250 W HCPCS SELF ADMINISTERED DRUGS (ALT 637 FOR MEDICARE OP, ALT 636 FOR OP/ED): Performed by: INTERNAL MEDICINE

## 2025-06-16 PROCEDURE — 94729 DIFFUSING CAPACITY: CPT

## 2025-06-16 PROCEDURE — 94618 PULMONARY STRESS TESTING: CPT

## 2025-06-16 PROCEDURE — 94664 DEMO&/EVAL PT USE INHALER: CPT

## 2025-06-16 PROCEDURE — 94640 AIRWAY INHALATION TREATMENT: CPT

## 2025-06-16 PROCEDURE — 94060 EVALUATION OF WHEEZING: CPT

## 2025-06-16 RX ORDER — ALBUTEROL SULFATE 0.83 MG/ML
3 SOLUTION RESPIRATORY (INHALATION) ONCE
Status: COMPLETED | OUTPATIENT
Start: 2025-06-16 | End: 2025-06-16

## 2025-06-16 RX ORDER — ALBUTEROL SULFATE 90 UG/1
1 INHALANT RESPIRATORY (INHALATION) ONCE
Status: COMPLETED | OUTPATIENT
Start: 2025-06-16 | End: 2025-06-16

## 2025-06-16 RX ADMIN — ALBUTEROL SULFATE 3 ML: 2.5 SOLUTION RESPIRATORY (INHALATION) at 08:41

## 2025-06-17 LAB
MGC ASCENT PFT - FEV1 - POST: 1.15
MGC ASCENT PFT - FEV1 - POST: 1.15
MGC ASCENT PFT - FEV1 - PRE: 1.1
MGC ASCENT PFT - FEV1 - PRE: 1.1
MGC ASCENT PFT - FEV1 - PREDICTED: 1.62
MGC ASCENT PFT - FEV1 - PREDICTED: 1.62
MGC ASCENT PFT - FVC - POST: 1.33
MGC ASCENT PFT - FVC - POST: 1.33
MGC ASCENT PFT - FVC - PRE: 1.35
MGC ASCENT PFT - FVC - PRE: 1.35
MGC ASCENT PFT - FVC - PREDICTED: 2.05
MGC ASCENT PFT - FVC - PREDICTED: 2.05

## 2025-06-18 ENCOUNTER — ANESTHESIA EVENT (OUTPATIENT)
Dept: OPERATING ROOM | Age: 75
End: 2025-06-18
Payer: MEDICARE

## 2025-06-18 ENCOUNTER — APPOINTMENT (OUTPATIENT)
Dept: GENERAL RADIOLOGY | Age: 75
End: 2025-06-18
Attending: ORTHOPAEDIC SURGERY
Payer: MEDICARE

## 2025-06-18 ENCOUNTER — HOSPITAL ENCOUNTER (OUTPATIENT)
Age: 75
Setting detail: OUTPATIENT SURGERY
Discharge: HOME | End: 2025-06-18
Attending: ORTHOPAEDIC SURGERY | Admitting: ORTHOPAEDIC SURGERY
Payer: MEDICARE

## 2025-06-18 ENCOUNTER — ANESTHESIA (OUTPATIENT)
Dept: OPERATING ROOM | Age: 75
End: 2025-06-18
Payer: MEDICARE

## 2025-06-18 VITALS
DIASTOLIC BLOOD PRESSURE: 88 MMHG | OXYGEN SATURATION: 97 % | BODY MASS INDEX: 23.09 KG/M2 | TEMPERATURE: 98 F | HEIGHT: 58 IN | HEART RATE: 83 BPM | SYSTOLIC BLOOD PRESSURE: 195 MMHG | WEIGHT: 110 LBS | RESPIRATION RATE: 18 BRPM

## 2025-06-18 DIAGNOSIS — Z01.818 PRE-OP EVALUATION: Primary | ICD-10-CM

## 2025-06-18 DIAGNOSIS — M19.011 DJD OF RIGHT SHOULDER: ICD-10-CM

## 2025-06-18 DIAGNOSIS — Z96.611 STATUS POST REVERSE TOTAL REPLACEMENT OF RIGHT SHOULDER: ICD-10-CM

## 2025-06-18 DIAGNOSIS — M19.011 PRIMARY OSTEOARTHRITIS OF RIGHT SHOULDER: ICD-10-CM

## 2025-06-18 LAB
GLUCOSE BLD-MCNC: 93 MG/DL (ref 74–99)
MAGNESIUM SERPL-MCNC: 2.1 MG/DL (ref 1.6–2.4)
POTASSIUM SERPL-SCNC: 3.4 MMOL/L (ref 3.5–5.1)

## 2025-06-18 PROCEDURE — 3700000001 HC ADD 15 MINUTES (ANESTHESIA): Performed by: ORTHOPAEDIC SURGERY

## 2025-06-18 PROCEDURE — 6360000002 HC RX W HCPCS: Performed by: ORTHOPAEDIC SURGERY

## 2025-06-18 PROCEDURE — 2500000003 HC RX 250 WO HCPCS: Performed by: ORTHOPAEDIC SURGERY

## 2025-06-18 PROCEDURE — 7100000001 HC PACU RECOVERY - ADDTL 15 MIN: Performed by: ORTHOPAEDIC SURGERY

## 2025-06-18 PROCEDURE — 6360000002 HC RX W HCPCS: Performed by: NURSE ANESTHETIST, CERTIFIED REGISTERED

## 2025-06-18 PROCEDURE — 2580000003 HC RX 258: Performed by: ANESTHESIOLOGY

## 2025-06-18 PROCEDURE — 88311 DECALCIFY TISSUE: CPT

## 2025-06-18 PROCEDURE — 6360000002 HC RX W HCPCS

## 2025-06-18 PROCEDURE — 3600000005 HC SURGERY LEVEL 5 BASE: Performed by: ORTHOPAEDIC SURGERY

## 2025-06-18 PROCEDURE — 7100000011 HC PHASE II RECOVERY - ADDTL 15 MIN: Performed by: ORTHOPAEDIC SURGERY

## 2025-06-18 PROCEDURE — 6360000002 HC RX W HCPCS: Performed by: ANESTHESIOLOGY

## 2025-06-18 PROCEDURE — 83735 ASSAY OF MAGNESIUM: CPT

## 2025-06-18 PROCEDURE — 3600000015 HC SURGERY LEVEL 5 ADDTL 15MIN: Performed by: ORTHOPAEDIC SURGERY

## 2025-06-18 PROCEDURE — 2709999900 HC NON-CHARGEABLE SUPPLY: Performed by: ORTHOPAEDIC SURGERY

## 2025-06-18 PROCEDURE — 2720000010 HC SURG SUPPLY STERILE: Performed by: ORTHOPAEDIC SURGERY

## 2025-06-18 PROCEDURE — 73030 X-RAY EXAM OF SHOULDER: CPT

## 2025-06-18 PROCEDURE — 88304 TISSUE EXAM BY PATHOLOGIST: CPT

## 2025-06-18 PROCEDURE — 6370000000 HC RX 637 (ALT 250 FOR IP): Performed by: ORTHOPAEDIC SURGERY

## 2025-06-18 PROCEDURE — 2500000003 HC RX 250 WO HCPCS

## 2025-06-18 PROCEDURE — C1776 JOINT DEVICE (IMPLANTABLE): HCPCS | Performed by: ORTHOPAEDIC SURGERY

## 2025-06-18 PROCEDURE — 64415 NJX AA&/STRD BRCH PLXS IMG: CPT | Performed by: ANESTHESIOLOGY

## 2025-06-18 PROCEDURE — 82962 GLUCOSE BLOOD TEST: CPT

## 2025-06-18 PROCEDURE — 7100000010 HC PHASE II RECOVERY - FIRST 15 MIN: Performed by: ORTHOPAEDIC SURGERY

## 2025-06-18 PROCEDURE — 84132 ASSAY OF SERUM POTASSIUM: CPT

## 2025-06-18 PROCEDURE — 2580000003 HC RX 258

## 2025-06-18 PROCEDURE — 23472 RECONSTRUCT SHOULDER JOINT: CPT | Performed by: ORTHOPAEDIC SURGERY

## 2025-06-18 PROCEDURE — 3700000000 HC ANESTHESIA ATTENDED CARE: Performed by: ORTHOPAEDIC SURGERY

## 2025-06-18 PROCEDURE — 7100000000 HC PACU RECOVERY - FIRST 15 MIN: Performed by: ORTHOPAEDIC SURGERY

## 2025-06-18 PROCEDURE — 6370000000 HC RX 637 (ALT 250 FOR IP)

## 2025-06-18 DEVICE — INHANCE SHOULDER SYSTEM  CENTRAL SCREW Ø6.0 X 30MM
Type: IMPLANTABLE DEVICE | Site: SHOULDER | Status: FUNCTIONAL
Brand: INHANCE

## 2025-06-18 DEVICE — INHANCE SHOULDER SYSTEM GLENOSPHERE Ø32+4MM
Type: IMPLANTABLE DEVICE | Site: SHOULDER | Status: FUNCTIONAL
Brand: INHANCE

## 2025-06-18 DEVICE — IMPLANTABLE DEVICE: Type: IMPLANTABLE DEVICE | Site: SHOULDER | Status: FUNCTIONAL

## 2025-06-18 DEVICE — INHANCE SHOULDER SYSTEM REVERSE HUMERAL SHELL SMALL Ø32+0MM
Type: IMPLANTABLE DEVICE | Site: SHOULDER | Status: FUNCTIONAL
Brand: INHANCE

## 2025-06-18 DEVICE — INHANCE SHOULDER SYSTEM  REVERSE LINER RETENTIVE X-LINKED VITAMIN E PE   Ø32+4MM
Type: IMPLANTABLE DEVICE | Site: SHOULDER | Status: FUNCTIONAL
Brand: INHANCE

## 2025-06-18 DEVICE — SHOULDER S3 TOT ADV REVERSE IMPL CAPPED S3: Type: IMPLANTABLE DEVICE | Site: SHOULDER | Status: FUNCTIONAL

## 2025-06-18 DEVICE — INHANCE SHOULDER SYSTEM  SELF-DRILLING & LOCKING SCREW 4 PACK 20MM & 25MM
Type: IMPLANTABLE DEVICE | Site: SHOULDER | Status: FUNCTIONAL
Brand: INHANCE

## 2025-06-18 RX ORDER — FENTANYL CITRATE 50 UG/ML
INJECTION, SOLUTION INTRAMUSCULAR; INTRAVENOUS
Status: COMPLETED
Start: 2025-06-18 | End: 2025-06-18

## 2025-06-18 RX ORDER — SODIUM CHLORIDE, SODIUM LACTATE, POTASSIUM CHLORIDE, CALCIUM CHLORIDE 600; 310; 30; 20 MG/100ML; MG/100ML; MG/100ML; MG/100ML
INJECTION, SOLUTION INTRAVENOUS CONTINUOUS
Status: DISCONTINUED | OUTPATIENT
Start: 2025-06-18 | End: 2025-06-18 | Stop reason: HOSPADM

## 2025-06-18 RX ORDER — FENTANYL CITRATE 50 UG/ML
25 INJECTION, SOLUTION INTRAMUSCULAR; INTRAVENOUS ONCE
Status: DISCONTINUED | OUTPATIENT
Start: 2025-06-18 | End: 2025-06-18

## 2025-06-18 RX ORDER — GLYCOPYRROLATE 0.2 MG/ML
INJECTION INTRAMUSCULAR; INTRAVENOUS
Status: DISCONTINUED | OUTPATIENT
Start: 2025-06-18 | End: 2025-06-18 | Stop reason: SDUPTHER

## 2025-06-18 RX ORDER — OXYCODONE AND ACETAMINOPHEN 10; 325 MG/1; MG/1
1 TABLET ORAL EVERY 6 HOURS PRN
Qty: 28 TABLET | Refills: 0 | Status: SHIPPED | OUTPATIENT
Start: 2025-06-18 | End: 2025-06-25

## 2025-06-18 RX ORDER — SODIUM CHLORIDE 9 MG/ML
INJECTION, SOLUTION INTRAVENOUS PRN
Status: DISCONTINUED | OUTPATIENT
Start: 2025-06-18 | End: 2025-06-18 | Stop reason: HOSPADM

## 2025-06-18 RX ORDER — HYDRALAZINE HYDROCHLORIDE 20 MG/ML
10 INJECTION INTRAMUSCULAR; INTRAVENOUS
Status: DISCONTINUED | OUTPATIENT
Start: 2025-06-18 | End: 2025-06-18 | Stop reason: HOSPADM

## 2025-06-18 RX ORDER — ACETAMINOPHEN 500 MG
1000 TABLET ORAL ONCE
Status: COMPLETED | OUTPATIENT
Start: 2025-06-18 | End: 2025-06-18

## 2025-06-18 RX ORDER — PROCHLORPERAZINE EDISYLATE 5 MG/ML
5 INJECTION INTRAMUSCULAR; INTRAVENOUS
Status: DISCONTINUED | OUTPATIENT
Start: 2025-06-18 | End: 2025-06-18 | Stop reason: HOSPADM

## 2025-06-18 RX ORDER — CEPHALEXIN 500 MG/1
500 CAPSULE ORAL 4 TIMES DAILY
Qty: 16 CAPSULE | Refills: 0 | Status: SHIPPED | OUTPATIENT
Start: 2025-06-18 | End: 2025-06-22

## 2025-06-18 RX ORDER — DEXAMETHASONE SODIUM PHOSPHATE 10 MG/ML
INJECTION, SOLUTION INTRAMUSCULAR; INTRAVENOUS
Status: DISCONTINUED
Start: 2025-06-18 | End: 2025-06-18 | Stop reason: HOSPADM

## 2025-06-18 RX ORDER — OXYCODONE AND ACETAMINOPHEN 10; 325 MG/1; MG/1
1 TABLET ORAL EVERY 4 HOURS PRN
Refills: 0 | Status: DISCONTINUED | OUTPATIENT
Start: 2025-06-18 | End: 2025-06-18 | Stop reason: HOSPADM

## 2025-06-18 RX ORDER — MIDAZOLAM HYDROCHLORIDE 1 MG/ML
INJECTION, SOLUTION INTRAMUSCULAR; INTRAVENOUS
Status: COMPLETED
Start: 2025-06-18 | End: 2025-06-18

## 2025-06-18 RX ORDER — FENTANYL CITRATE 0.05 MG/ML
25 INJECTION, SOLUTION INTRAMUSCULAR; INTRAVENOUS EVERY 5 MIN PRN
Status: DISCONTINUED | OUTPATIENT
Start: 2025-06-18 | End: 2025-06-18 | Stop reason: HOSPADM

## 2025-06-18 RX ORDER — NALOXONE HYDROCHLORIDE 0.4 MG/ML
INJECTION, SOLUTION INTRAMUSCULAR; INTRAVENOUS; SUBCUTANEOUS PRN
Status: DISCONTINUED | OUTPATIENT
Start: 2025-06-18 | End: 2025-06-18 | Stop reason: HOSPADM

## 2025-06-18 RX ORDER — BISACODYL 10 MG
10 SUPPOSITORY, RECTAL RECTAL PRN
Status: DISCONTINUED | OUTPATIENT
Start: 2025-06-18 | End: 2025-06-18 | Stop reason: HOSPADM

## 2025-06-18 RX ORDER — PHENYLEPHRINE HCL IN 0.9% NACL 1 MG/10 ML
SYRINGE (ML) INTRAVENOUS
Status: DISCONTINUED | OUTPATIENT
Start: 2025-06-18 | End: 2025-06-18 | Stop reason: SDUPTHER

## 2025-06-18 RX ORDER — ACETAMINOPHEN 325 MG/1
650 TABLET ORAL EVERY 4 HOURS PRN
Status: DISCONTINUED | OUTPATIENT
Start: 2025-06-18 | End: 2025-06-18 | Stop reason: HOSPADM

## 2025-06-18 RX ORDER — ROPIVACAINE HYDROCHLORIDE 5 MG/ML
INJECTION, SOLUTION EPIDURAL; INFILTRATION; PERINEURAL
Status: COMPLETED | OUTPATIENT
Start: 2025-06-18 | End: 2025-06-18

## 2025-06-18 RX ORDER — NEOSTIGMINE METHYLSULFATE 1 MG/ML
INJECTION, SOLUTION INTRAVENOUS
Status: DISCONTINUED | OUTPATIENT
Start: 2025-06-18 | End: 2025-06-18 | Stop reason: SDUPTHER

## 2025-06-18 RX ORDER — PROPOFOL 10 MG/ML
INJECTION, EMULSION INTRAVENOUS
Status: DISCONTINUED | OUTPATIENT
Start: 2025-06-18 | End: 2025-06-18 | Stop reason: SDUPTHER

## 2025-06-18 RX ORDER — FENTANYL CITRATE 50 UG/ML
INJECTION, SOLUTION INTRAMUSCULAR; INTRAVENOUS
Status: DISCONTINUED | OUTPATIENT
Start: 2025-06-18 | End: 2025-06-18 | Stop reason: SDUPTHER

## 2025-06-18 RX ORDER — LISINOPRIL 5 MG/1
5 TABLET ORAL DAILY
Status: CANCELLED | OUTPATIENT
Start: 2025-06-18

## 2025-06-18 RX ORDER — DEXAMETHASONE SODIUM PHOSPHATE 10 MG/ML
8 INJECTION, SOLUTION INTRAMUSCULAR; INTRAVENOUS ONCE
Status: COMPLETED | OUTPATIENT
Start: 2025-06-18 | End: 2025-06-18

## 2025-06-18 RX ORDER — MORPHINE SULFATE 2 MG/ML
2 INJECTION, SOLUTION INTRAMUSCULAR; INTRAVENOUS EVERY 4 HOURS PRN
Refills: 0 | Status: DISCONTINUED | OUTPATIENT
Start: 2025-06-18 | End: 2025-06-18 | Stop reason: HOSPADM

## 2025-06-18 RX ORDER — MEPERIDINE HYDROCHLORIDE 50 MG/ML
12.5 INJECTION INTRAMUSCULAR; INTRAVENOUS; SUBCUTANEOUS EVERY 5 MIN PRN
Status: DISCONTINUED | OUTPATIENT
Start: 2025-06-18 | End: 2025-06-18 | Stop reason: HOSPADM

## 2025-06-18 RX ORDER — M-VIT,TX,IRON,MINS/CALC/FOLIC 27MG-0.4MG
1 TABLET ORAL DAILY
Status: DISCONTINUED | OUTPATIENT
Start: 2025-06-18 | End: 2025-06-18 | Stop reason: HOSPADM

## 2025-06-18 RX ORDER — DOCUSATE SODIUM 100 MG/1
100 CAPSULE, LIQUID FILLED ORAL 2 TIMES DAILY
Status: DISCONTINUED | OUTPATIENT
Start: 2025-06-18 | End: 2025-06-18 | Stop reason: HOSPADM

## 2025-06-18 RX ORDER — SODIUM CHLORIDE 0.9 % (FLUSH) 0.9 %
5-40 SYRINGE (ML) INJECTION PRN
Status: DISCONTINUED | OUTPATIENT
Start: 2025-06-18 | End: 2025-06-18 | Stop reason: HOSPADM

## 2025-06-18 RX ORDER — LEFLUNOMIDE 10 MG/1
10 TABLET ORAL DAILY
Status: CANCELLED | OUTPATIENT
Start: 2025-06-18

## 2025-06-18 RX ORDER — SCOPOLAMINE 1 MG/3D
1 PATCH, EXTENDED RELEASE TRANSDERMAL
Status: DISCONTINUED | OUTPATIENT
Start: 2025-06-18 | End: 2025-06-18 | Stop reason: HOSPADM

## 2025-06-18 RX ORDER — ROSUVASTATIN CALCIUM 10 MG/1
10 TABLET, COATED ORAL DAILY
Status: CANCELLED | OUTPATIENT
Start: 2025-06-18

## 2025-06-18 RX ORDER — MORPHINE SULFATE 10 MG/ML
2 INJECTION, SOLUTION INTRAMUSCULAR; INTRAVENOUS EVERY 4 HOURS PRN
Refills: 0 | Status: DISCONTINUED | OUTPATIENT
Start: 2025-06-18 | End: 2025-06-18 | Stop reason: CLARIF

## 2025-06-18 RX ORDER — DEXTROSE MONOHYDRATE, SODIUM CHLORIDE, AND POTASSIUM CHLORIDE 50; 1.49; 4.5 G/1000ML; G/1000ML; G/1000ML
INJECTION, SOLUTION INTRAVENOUS CONTINUOUS
Status: DISCONTINUED | OUTPATIENT
Start: 2025-06-18 | End: 2025-06-18 | Stop reason: HOSPADM

## 2025-06-18 RX ORDER — ONDANSETRON 2 MG/ML
INJECTION INTRAMUSCULAR; INTRAVENOUS
Status: DISCONTINUED | OUTPATIENT
Start: 2025-06-18 | End: 2025-06-18 | Stop reason: SDUPTHER

## 2025-06-18 RX ORDER — SODIUM CHLORIDE 0.9 % (FLUSH) 0.9 %
5-40 SYRINGE (ML) INJECTION EVERY 12 HOURS SCHEDULED
Status: DISCONTINUED | OUTPATIENT
Start: 2025-06-18 | End: 2025-06-18 | Stop reason: HOSPADM

## 2025-06-18 RX ORDER — FENTANYL CITRATE 50 UG/ML
50 INJECTION, SOLUTION INTRAMUSCULAR; INTRAVENOUS ONCE
Status: COMPLETED | OUTPATIENT
Start: 2025-06-18 | End: 2025-06-18

## 2025-06-18 RX ORDER — OXYCODONE AND ACETAMINOPHEN 5; 325 MG/1; MG/1
1 TABLET ORAL EVERY 6 HOURS PRN
Qty: 28 TABLET | Refills: 0 | Status: SHIPPED | OUTPATIENT
Start: 2025-06-18 | End: 2025-06-18 | Stop reason: HOSPADM

## 2025-06-18 RX ORDER — IPRATROPIUM BROMIDE AND ALBUTEROL SULFATE 2.5; .5 MG/3ML; MG/3ML
1 SOLUTION RESPIRATORY (INHALATION)
Status: DISCONTINUED | OUTPATIENT
Start: 2025-06-18 | End: 2025-06-18 | Stop reason: HOSPADM

## 2025-06-18 RX ORDER — PROMETHAZINE HYDROCHLORIDE 25 MG/ML
25 INJECTION, SOLUTION INTRAMUSCULAR; INTRAVENOUS EVERY 6 HOURS PRN
Status: DISCONTINUED | OUTPATIENT
Start: 2025-06-18 | End: 2025-06-18 | Stop reason: HOSPADM

## 2025-06-18 RX ORDER — DROPERIDOL 2.5 MG/ML
0.62 INJECTION, SOLUTION INTRAMUSCULAR; INTRAVENOUS
Status: DISCONTINUED | OUTPATIENT
Start: 2025-06-18 | End: 2025-06-18 | Stop reason: HOSPADM

## 2025-06-18 RX ORDER — LABETALOL HYDROCHLORIDE 5 MG/ML
10 INJECTION, SOLUTION INTRAVENOUS
Status: DISCONTINUED | OUTPATIENT
Start: 2025-06-18 | End: 2025-06-18 | Stop reason: HOSPADM

## 2025-06-18 RX ORDER — PREDNISONE 5 MG/1
5 TABLET ORAL DAILY
Status: CANCELLED | OUTPATIENT
Start: 2025-06-19

## 2025-06-18 RX ORDER — MIDAZOLAM HYDROCHLORIDE 1 MG/ML
2 INJECTION, SOLUTION INTRAMUSCULAR; INTRAVENOUS
Status: DISCONTINUED | OUTPATIENT
Start: 2025-06-18 | End: 2025-06-18 | Stop reason: HOSPADM

## 2025-06-18 RX ORDER — DIPHENHYDRAMINE HYDROCHLORIDE 50 MG/ML
12.5 INJECTION, SOLUTION INTRAMUSCULAR; INTRAVENOUS
Status: DISCONTINUED | OUTPATIENT
Start: 2025-06-18 | End: 2025-06-18 | Stop reason: HOSPADM

## 2025-06-18 RX ORDER — VANCOMYCIN HYDROCHLORIDE 1 G/20ML
INJECTION, POWDER, LYOPHILIZED, FOR SOLUTION INTRAVENOUS PRN
Status: DISCONTINUED | OUTPATIENT
Start: 2025-06-18 | End: 2025-06-18 | Stop reason: ALTCHOICE

## 2025-06-18 RX ORDER — CELECOXIB 100 MG/1
100 CAPSULE ORAL ONCE
Status: COMPLETED | OUTPATIENT
Start: 2025-06-18 | End: 2025-06-18

## 2025-06-18 RX ORDER — HYDROXYCHLOROQUINE SULFATE 200 MG/1
200 TABLET, FILM COATED ORAL DAILY
Status: CANCELLED | OUTPATIENT
Start: 2025-06-30

## 2025-06-18 RX ORDER — MIDAZOLAM HYDROCHLORIDE 1 MG/ML
1 INJECTION, SOLUTION INTRAMUSCULAR; INTRAVENOUS ONCE
Status: COMPLETED | OUTPATIENT
Start: 2025-06-18 | End: 2025-06-18

## 2025-06-18 RX ORDER — ROPIVACAINE HYDROCHLORIDE 5 MG/ML
INJECTION, SOLUTION EPIDURAL; INFILTRATION; PERINEURAL
Status: DISCONTINUED
Start: 2025-06-18 | End: 2025-06-18 | Stop reason: HOSPADM

## 2025-06-18 RX ORDER — LIDOCAINE HYDROCHLORIDE 20 MG/ML
INJECTION, SOLUTION INTRAVENOUS
Status: DISCONTINUED | OUTPATIENT
Start: 2025-06-18 | End: 2025-06-18 | Stop reason: SDUPTHER

## 2025-06-18 RX ADMIN — ROPIVACAINE HYDROCHLORIDE 20 ML: 5 INJECTION, SOLUTION EPIDURAL; INFILTRATION; PERINEURAL at 10:15

## 2025-06-18 RX ADMIN — CELECOXIB 100 MG: 100 CAPSULE ORAL at 08:26

## 2025-06-18 RX ADMIN — Medication 100 MCG: at 11:55

## 2025-06-18 RX ADMIN — FENTANYL CITRATE 50 MCG: 50 INJECTION, SOLUTION INTRAMUSCULAR; INTRAVENOUS at 10:13

## 2025-06-18 RX ADMIN — ONDANSETRON 4 MG: 2 INJECTION, SOLUTION INTRAMUSCULAR; INTRAVENOUS at 13:05

## 2025-06-18 RX ADMIN — FENTANYL CITRATE 25 MCG: 50 INJECTION, SOLUTION INTRAMUSCULAR; INTRAVENOUS at 12:55

## 2025-06-18 RX ADMIN — LIDOCAINE HYDROCHLORIDE 60 MG: 20 INJECTION, SOLUTION INTRAVENOUS at 11:28

## 2025-06-18 RX ADMIN — SODIUM CHLORIDE, POTASSIUM CHLORIDE, SODIUM LACTATE AND CALCIUM CHLORIDE: 600; 310; 30; 20 INJECTION, SOLUTION INTRAVENOUS at 08:25

## 2025-06-18 RX ADMIN — ACETAMINOPHEN 1000 MG: 500 TABLET ORAL at 08:25

## 2025-06-18 RX ADMIN — Medication 3 MG: at 12:54

## 2025-06-18 RX ADMIN — Medication 100 MCG: at 12:30

## 2025-06-18 RX ADMIN — GLYCOPYRROLATE 0.4 MG: 0.2 INJECTION INTRAMUSCULAR; INTRAVENOUS at 12:54

## 2025-06-18 RX ADMIN — SODIUM CHLORIDE, POTASSIUM CHLORIDE, SODIUM LACTATE AND CALCIUM CHLORIDE: 600; 310; 30; 20 INJECTION, SOLUTION INTRAVENOUS at 12:43

## 2025-06-18 RX ADMIN — CEFAZOLIN SODIUM 2000 MG: 1 POWDER, FOR SOLUTION INTRAMUSCULAR; INTRAVENOUS at 14:34

## 2025-06-18 RX ADMIN — DEXAMETHASONE SODIUM PHOSPHATE 8 MG: 10 INJECTION, SOLUTION INTRAMUSCULAR; INTRAVENOUS at 08:26

## 2025-06-18 RX ADMIN — FENTANYL CITRATE 50 MCG: 50 INJECTION, SOLUTION INTRAMUSCULAR; INTRAVENOUS at 11:28

## 2025-06-18 RX ADMIN — FENTANYL CITRATE 50 MCG: 50 INJECTION, SOLUTION INTRAMUSCULAR; INTRAVENOUS at 11:55

## 2025-06-18 RX ADMIN — FENTANYL CITRATE 50 MCG: 50 INJECTION INTRAMUSCULAR; INTRAVENOUS at 10:13

## 2025-06-18 RX ADMIN — MIDAZOLAM HYDROCHLORIDE 1 MG: 1 INJECTION, SOLUTION INTRAMUSCULAR; INTRAVENOUS at 10:13

## 2025-06-18 RX ADMIN — MIDAZOLAM 1 MG: 1 INJECTION INTRAMUSCULAR; INTRAVENOUS at 10:13

## 2025-06-18 RX ADMIN — CEFAZOLIN SODIUM 2000 MG: 1 POWDER, FOR SOLUTION INTRAMUSCULAR; INTRAVENOUS at 11:34

## 2025-06-18 RX ADMIN — PROPOFOL 125 MG: 10 INJECTION, EMULSION INTRAVENOUS at 11:28

## 2025-06-18 RX ADMIN — FENTANYL CITRATE 50 MCG: 50 INJECTION, SOLUTION INTRAMUSCULAR; INTRAVENOUS at 13:43

## 2025-06-18 RX ADMIN — FENTANYL CITRATE 25 MCG: 50 INJECTION, SOLUTION INTRAMUSCULAR; INTRAVENOUS at 13:12

## 2025-06-18 ASSESSMENT — PAIN - FUNCTIONAL ASSESSMENT: PAIN_FUNCTIONAL_ASSESSMENT: 0-10

## 2025-06-18 ASSESSMENT — PAIN DESCRIPTION - DESCRIPTORS: DESCRIPTORS: DISCOMFORT

## 2025-06-18 NOTE — ANESTHESIA POSTPROCEDURE EVALUATION
Department of Anesthesiology  Postprocedure Note    Patient: Sharita Askew  MRN: [id number]  YOB: 1950  Date of evaluation: 6/18/2025    Procedure Summary       Date: 06/18/25 Room / Location: [address]    Anesthesia Start: 1121 Anesthesia Stop: 1345    Procedure: RIGHT SHOULDER TOTAL SHOULDER ARTHROPLASTY-6/18/25 DEPUY (Right: Shoulder) Diagnosis:       DJD of right shoulder      (DJD of right shoulder [M19.011])    Surgeons: Clint Fagan DO Responsible Provider: Mir Vargas DO    Anesthesia Type: general ASA Status: 3            Anesthesia Type: No value filed.    Marisol Phase I: Marisol Score: 10    Marisol Phase II: Marisol Score: 10    Anesthesia Post Evaluation    Patient location during evaluation: PACU  Patient participation: complete - patient participated  Level of consciousness: awake and alert  Pain score: 0  Airway patency: patent  Nausea & Vomiting: no nausea and no vomiting  Cardiovascular status: blood pressure returned to baseline and hemodynamically stable  Respiratory status: acceptable  Hydration status: stable  Pain management: adequate    No notable events documented.

## 2025-06-18 NOTE — DISCHARGE INSTRUCTIONS
[facility] Department of Orthopedics  [address]   [place] [address]    MD Ino Hamilton DO K Brian Williams, DO    Orthopaedics Discharge Instructions   NWB on right upper extremity  Pain medication Per Prescriptions  Contact Office for Medication Refill- [phone]  Office can refill pain med every 7 days  If patient discharging to facility then pain control will be continued per facility physician  Ice to operative/injured site for 15-30 minutes of each hour for next 5 days    Recommend that you continue to ice the area 2-3 times per day after this   Elevate operative/injured limb on 2 pillows at home  Goal is to have limb above the heart if able  Wound care -dressings remain clean dry intact.  On postoperative day 7 can be removed and surgical site can be clean with soapy water.      Follow Up in Office in 2 weeks.       Call the office at [phone] for directions or with any questions.  Watch for these significant complications.  Call physician if they or any other problems occur:  Fever over 101°, redness, swelling or warmth at the operative site  Unrelieved nausea    Foul smelling or cloudy drainage at the operative site   Unrelieved pain    Blood soaked dressing. (Some oozing may be normal)     Numb, pale, blue, cold or tingling extremity

## 2025-06-18 NOTE — OP NOTE
Operative Note      Patient: Sharita Askew  YOB: 1950  MRN: [id number]    Date of Procedure: 6/18/2025    Pre-Op Diagnosis Codes:      * DJD of right shoulder [M19.011]    Post-Op Diagnosis: Same       Procedure: right reverse total shoulder arthroplasty    Surgeon(s):  Clint Fagan DO    Assistant:   Resident: Robby Yun DO; Frandy Cordero DO    Anesthesia: General    Estimated Blood Loss (mL): less than 100     Complications: None    Specimens:   ID Type Source Tests Collected by Time Destination   A : Bone right shoulder Tissue Tissue SURGICAL PATHOLOGY Clint Fagan DO 6/18/2025 1226        Implants:  Implant Name Type Inv. Item Serial No.  Lot No. LRB No. Used Action   BASEPLATE CRISTIANA DIA24 MM SM MOD UNITI PLATFORM CMTLS STRL - [id number]  BASEPLATE CRISTIANA DIA24 MM SM MOD UNITI PLATFORM CMTLS STRL  [facility]  Right 1 Implanted   SPHERE CRISTIANA 4+ MM 32 MM SHLDR STRL INHANCE LTX - [id number]  SPHERE CRISTIANA 4+ MM 32 MM SHLDR STRL INHANCE LTX  [facility] [id number] Right 1 Implanted   SCREW BNE LCK 20X25 MM SHLDR SD STRL INHANCE LTX - [id number]  SCREW BNE LCK 20X25 MM SHLDR SD STRL INHANCE LTX  [facility] [id number] Right 1 Implanted   SCREW BNE 6X30 MM SHLDR CTRL STRL INHANCE LTX - [id number]  SCREW BNE 6X30 MM SHLDR CTRL STRL INHANCE LTX  [facility] [id number] Right 1 Implanted   SHELL HUM SM 0+ MM 32 MM SHLDR REVERSED STRL INHANCE LTX - [id number]  SHELL HUM SM 0+ MM 32 MM SHLDR REVERSED STRL INHANCE LTX  [facility] [id number] Right 1 Implanted   LINER HUM RETENTIVE 4+ MM 32 MM CROSSLINKED REVERSED LTX - [id number]  LINER HUM RETENTIVE 4+ MM 32 MM CROSSLINKED REVERSED LTX  [facility] [id number] Right 1 Implanted   SHELL HUM SM 0+ MM 32 MM SHLDR REVERSED STRL INHANCE LTX - [id number]  SHELL HUM SM 0+ MM 32 MM SHLDR REVERSED STRL INHANCE LTX  [employer] DEPUY

## 2025-06-18 NOTE — PROGRESS NOTES
0939-Patient brought to Pacu and connected to monitors. Consents signed and verified. /97, Apical 79, 100% on room air. O2 applied at 3L  via n/c for procedure.   1012-Timeout completed for Right interscalene block with Dr. Vargas. All agree.   1013-Patient premedicated for procedure-See MAR  1015-1021-Right interscalene block completed at this time with Dr. aVrgas using ultrasound guidance and nerve stimulater. Twitch 0.8.Injected with 20 cc ropivocaine. Patient tolerated well.  1022-Bp 160/68 Apical 74. SpO2 100% on O2. Patient resting comfortably in bed.

## 2025-06-18 NOTE — ANESTHESIA PRE PROCEDURE
Department of Anesthesiology  Preprocedure Note       Name:  Sharita Askew   Age:  74 y.o.  :  1950                                          MRN:  [id number]         Date:  2025      Surgeon: Surgeon(s):  Clint Fagan DO    Procedure: Procedure(s):  RIGHT SHOULDER TOTAL SHOULDER ARTHROPLASTY-25 DEPUY    Medications prior to admission:   Prior to Admission medications    Medication Sig Start Date End Date Taking? Authorizing Provider   predniSONE (DELTASONE) 5 MG tablet Take 1 tablet by mouth daily 2 tabs 23  Yes Kandi King MD   diclofenac sodium (VOLTAREN) 1 % GEL APPLY TOPICALLY EVERY 8 HOURS AS NEEDED 24   Kandi King MD   lisinopril (PRINIVIL;ZESTRIL) 5 MG tablet Take 1 tablet by mouth daily    Kandi King MD   leflunomide (ARAVA) 10 MG tablet daily 10/5/23   Kandi King MD   hydroxychloroquine (PLAQUENIL) 200 MG tablet Take 1 tablet by mouth daily 23   Kandi King MD   rosuvastatin (CRESTOR) 10 MG tablet Take by mouth daily 21   ProviderKandi MD   Multiple Vitamin (THERA) TABS Take by mouth daily    ProviderKandi MD       Current medications:    Current Facility-Administered Medications   Medication Dose Route Frequency Provider Last Rate Last Admin   • ortho mix injection   Injection On Call Crys Jackson PA-C       • sodium chloride flush 0.9 % injection 5-40 mL  5-40 mL IntraVENous 2 times per day Crys Jackson PA-C       • sodium chloride flush 0.9 % injection 5-40 mL  5-40 mL IntraVENous PRN Crys Jackson PA-C       • 0.9 % sodium chloride infusion   IntraVENous PRN Crys Jackson PA-C       • ceFAZolin (ANCEF) 2,000 mg in sterile water 20 mL IV syringe  2,000 mg IntraVENous On Call to OR Crys Jackson PA-C       • lactated ringers infusion   IntraVENous Continuous Jj Hampton  mL/hr at 25 0825 New Bag at 25 0825   • scopolamine

## 2025-06-18 NOTE — ANESTHESIA PROCEDURE NOTES
Peripheral Block    Patient location during procedure: holding area  Reason for block: post-op pain management and at surgeon's request  Start time: 6/18/2025 10:15 AM  End time: 6/18/2025 10:21 AM  Staffing  Performed: anesthesiologist   Anesthesiologist: Mir Vargas DO  Performed by: Mir Vargas DO  Authorized by: Mir Vargas DO    Preanesthetic Checklist  Completed: patient identified, IV checked, site marked, risks and benefits discussed, surgical/procedural consents, equipment checked, pre-op evaluation, timeout performed, anesthesia consent given, oxygen available and monitors applied/VS acknowledged  Peripheral Block   Patient position: sitting  Prep: ChloraPrep  Provider prep: mask and sterile gloves  Patient monitoring: cardiac monitor, continuous pulse ox, frequent blood pressure checks and IV access  Block type: Brachial plexus  Interscalene  Laterality: right  Injection technique: single-shot  Guidance: ultrasound guided  Local infiltration: lidocaine  Infiltration strength: 1 %  Local infiltration: lidocaine  Dose: 1 mLDose: 1 mL    Needle   Needle type: insulated echogenic nerve stimulator needle   Needle gauge: 21 G  Needle localization: ultrasound guidance, nerve stimulator and anatomical landmarks  Needle length: 5 cm  Assessment   Injection assessment: negative aspiration for heme, no paresthesia on injection, local visualized surrounding nerve on ultrasound and no intravascular symptoms  Paresthesia pain: none  Slow fractionated injection: yes  Hemodynamics: stable  Outcomes: uncomplicated and patient tolerated procedure well    Additional Notes              Medications Administered  ropivacaine (NAROPIN) injection 0.5% - Perineural, Shoulder Right   20 mL - 6/18/2025 10:15:00 AM

## 2025-06-18 NOTE — H&P
Updated H&P    Chief Complaint   Patient presents with    Shoulder Pain       Right shoulder f/u. Patient states pain is persistent and unchanged since last visit. Pain is described as tight. States arm feels heavy. Patient is RHD. Pain level today 6/10         Sharita Askew is a 74 y.o. year old   female who is seen today for follow up of right shoulder. She stated she had injection done with no relief. Her pain level is 6/10. She has been dealing with shoulder pain for at least a year.             Chief Complaint   Patient presents with    Shoulder Pain       Right shoulder f/u. Patient states pain is persistent and unchanged since last visit. Pain is described as tight. States arm feels heavy. Patient is RHD. Pain level today 6/10      Past Medical History        Past Medical History:   Diagnosis Date    MRSA infection           Past Surgical History         Past Surgical History:   Procedure Laterality Date    BACK SURGERY        BONE INCISION AND DRAINAGE   4/19/2011     left knee    DILATION AND CURETTAGE OF UTERUS        HYSTERECTOMY (CERVIX STATUS UNKNOWN)               Current Medication      Current Outpatient Medications:     diclofenac sodium (VOLTAREN) 1 % GEL, APPLY TOPICALLY EVERY 8 HOURS AS NEEDED, Disp: , Rfl:     lisinopril (PRINIVIL;ZESTRIL) 5 MG tablet, Take 1 tablet by mouth daily, Disp: , Rfl:     leflunomide (ARAVA) 10 MG tablet, , Disp: , Rfl:     predniSONE (DELTASONE) 5 MG tablet, Take 0.5 tablets by mouth daily, Disp: , Rfl:     hydroxychloroquine (PLAQUENIL) 200 MG tablet, Take 1 tablet by mouth daily, Disp: , Rfl:     rosuvastatin (CRESTOR) 10 MG tablet, Take by mouth, Disp: , Rfl:     Multiple Vitamin (THERA) TABS, Take  by mouth.  , Disp: , Rfl:      Allergies        Allergies   Allergen Reactions    Demerol           Social History               Socioeconomic History    Marital status:        Spouse name: Not on file    Number of children: Not on file    Years of  general, patient is awake, alert and oriented X3, in no apparent distress.  Examination of HENT reveals normocephalic, atraumatic.  PERRLA/EOMI sclera are white.  Conjunctivae are clear.  TM's are intact.  Pharynx is pink and moist.  Uvula and tongue are midline.  Heart: Positive S1 and positive S2 with regular rate and rhythm.  Lungs: Clear to auscultation bilaterally without rales, rhonchi or wheezes.  Abdomen: soft, nontender.  Positive bowel sounds.  No organomegaly.  No guarding or rigidity.     Psycihatric:  The patient is alert and oriented x 3, appears to be stated age and in no distress.       Respiratory:  Respiratory effort is not labored.  Patient is not gasping.  Palpation of the chest reveals no tactile fremitus.     Skin:  Upon inspection: the skin appears warm, dry and intact.  There is not a previous scar over the affected area.There is not any cellulitis, lymphedema or cutaneous lesions noted in the lower extremities.   Upon palpation there is no induration noted.       Neurologic:  Motor exam of the upper extremities show: The reflexes in biceps/triceps/brachioradialis are equal and symmetric.  Sensory exam C5-T1 are normal bilaterally.     Cardiovascular:  The vascular exam is normal and is well perfused to distal extremities.There are 2+ radial pulses bilaterally, and motor and sensation is intact to median, ulnar, and radial, musclocutaneus, and axillary nerve distribution and grossly symmetric bilaterally.  There is cap refill noted less than two seconds in all digits. There is not edema of the bilateral upper extremities.  There is not varicosities noted in the distal extremities.       Lymph:  Upon palpation,  there is no lymphadenopathy noted in bilateral upper extremities.       Musculoskeletal:  Gait: normal; examination of the nails and digits reveal no cyanosis or clubbing.        Cervical Exam:  On physical exam, Sharita Askew is well-developed, well-nourished, oriented to person,

## 2025-06-24 ENCOUNTER — APPOINTMENT (OUTPATIENT)
Dept: RESPIRATORY THERAPY | Facility: HOSPITAL | Age: 75
End: 2025-06-24
Payer: MEDICARE

## 2025-06-25 DIAGNOSIS — S46.011A TRAUMATIC COMPLETE TEAR OF RIGHT ROTATOR CUFF, INITIAL ENCOUNTER: Primary | ICD-10-CM

## 2025-06-25 LAB — SURGICAL PATHOLOGY REPORT: NORMAL

## 2025-06-26 NOTE — PROGRESS NOTES
CLINICAL PHARMACY NOTE: MEDS TO BEDS    Total # of Prescriptions Filled: 1   The following medications were delivered to the patient:  Cephalexin 500 mg    Additional Documentation:    picked up at the pharmacy

## 2025-06-27 ENCOUNTER — APPOINTMENT (OUTPATIENT)
Dept: PULMONOLOGY | Facility: HOSPITAL | Age: 75
End: 2025-06-27
Payer: MEDICARE

## 2025-07-01 ENCOUNTER — OFFICE VISIT (OUTPATIENT)
Dept: ORTHOPEDIC SURGERY | Age: 75
End: 2025-07-01

## 2025-07-01 VITALS — HEIGHT: 58 IN | WEIGHT: 110 LBS | TEMPERATURE: 98 F | BODY MASS INDEX: 23.09 KG/M2

## 2025-07-01 DIAGNOSIS — S46.011A TRAUMATIC COMPLETE TEAR OF RIGHT ROTATOR CUFF, INITIAL ENCOUNTER: ICD-10-CM

## 2025-07-01 DIAGNOSIS — M19.011 GLENOHUMERAL ARTHRITIS, RIGHT: Primary | ICD-10-CM

## 2025-07-01 PROCEDURE — 99024 POSTOP FOLLOW-UP VISIT: CPT | Performed by: ORTHOPAEDIC SURGERY

## 2025-07-01 NOTE — PROGRESS NOTES
Patient here for 2 weeks post right shoulder reverse TSA 6/18/25.  Pain is controlled with current analgesics.  Medication(s) being used:  percocet.  The patient denies  fever, wound drainage, increasing redness, pus, increasing pain, increasing swelling.    Shoulder exam -  diminished range of motion  Pain on motion, no tenderness or deformity noted.    Incision is clean, dry and intact.    Motor and sensory exam is grossly intact in median, ulnar, radial, musclocutaneous, and axillary nerve distribution.   Peripheral pulses +2    X-rays:  Well aligned prosthesis, no abnormalities,    Radiographic findings reviewed with patient    DX:   Encounter Diagnoses   Name Primary?    Glenohumeral arthritis, right Yes    Traumatic complete tear of right rotator cuff, initial encounter         Plan:  She can begin gentle pendulums.  She will continue with sling and elbow/wrist/hand ROM.  I will see her back in 4 weeks.

## 2025-07-08 ENCOUNTER — TELEPHONE (OUTPATIENT)
Dept: PULMONOLOGY | Facility: HOSPITAL | Age: 75
End: 2025-07-08
Payer: MEDICARE

## 2025-07-08 DIAGNOSIS — J84.9 ILD (INTERSTITIAL LUNG DISEASE) (MULTI): ICD-10-CM

## 2025-07-08 RX ORDER — PREDNISONE 5 MG/1
10 TABLET ORAL DAILY
Qty: 60 TABLET | Refills: 1 | Status: SHIPPED | OUTPATIENT
Start: 2025-07-08

## 2025-07-08 NOTE — TELEPHONE ENCOUNTER
Returned pt's call regarding the need of a refill on her prednisone. Pt stated that she is taking 10 mg prednisone daily but has 5 mg tablets. Pt states she prefers the 5 mg tablet for titration purposes when needed. Order placed and routed to Dr. Mccarthy to sign.

## 2025-07-16 ENCOUNTER — TELEPHONE (OUTPATIENT)
Dept: PULMONOLOGY | Facility: HOSPITAL | Age: 75
End: 2025-07-16
Payer: MEDICARE

## 2025-07-16 NOTE — TELEPHONE ENCOUNTER
Called pt about appt change, did not receive an answer, a detailed message was left with new date and time.

## 2025-07-22 DIAGNOSIS — M19.011 GLENOHUMERAL ARTHRITIS, RIGHT: Primary | ICD-10-CM

## 2025-07-29 ENCOUNTER — OFFICE VISIT (OUTPATIENT)
Dept: ORTHOPEDIC SURGERY | Age: 75
End: 2025-07-29

## 2025-07-29 VITALS — HEIGHT: 57 IN | BODY MASS INDEX: 23.73 KG/M2 | WEIGHT: 110 LBS

## 2025-07-29 DIAGNOSIS — M19.011 GLENOHUMERAL ARTHRITIS, RIGHT: Primary | ICD-10-CM

## 2025-07-29 DIAGNOSIS — S46.011A TRAUMATIC COMPLETE TEAR OF RIGHT ROTATOR CUFF, INITIAL ENCOUNTER: ICD-10-CM

## 2025-07-29 PROCEDURE — 99024 POSTOP FOLLOW-UP VISIT: CPT | Performed by: ORTHOPAEDIC SURGERY

## 2025-07-29 NOTE — PROGRESS NOTES
Patient here for 2 weeks post right shoulder reverse TSA 6/18/25.  Pain is controlled with current analgesics.  Medication(s) being used:  percocet.  The patient denies  fever, wound drainage, increasing redness, pus, increasing pain, increasing swelling.    Shoulder exam -  diminished range of motion  Pain on motion, no tenderness or deformity noted.    Incision is clean, dry and intact.    Motor and sensory exam is grossly intact in median, ulnar, radial, musclocutaneous, and axillary nerve distribution.   Peripheral pulses +2    X-rays:  Not performed today.    Radiographic findings reviewed with patient    DX:   Encounter Diagnoses   Name Primary?    Glenohumeral arthritis, right Yes    Traumatic complete tear of right rotator cuff, initial encounter           Plan:  She can D/C sling and begin physical therapy.  I will see her back in 2 months.

## 2025-08-08 ENCOUNTER — APPOINTMENT (OUTPATIENT)
Dept: PULMONOLOGY | Facility: HOSPITAL | Age: 75
End: 2025-08-08
Payer: MEDICARE

## 2025-08-15 ENCOUNTER — TELEPHONE (OUTPATIENT)
Dept: PULMONOLOGY | Facility: HOSPITAL | Age: 75
End: 2025-08-15
Payer: MEDICARE

## 2025-08-15 DIAGNOSIS — J84.9 ILD (INTERSTITIAL LUNG DISEASE) (MULTI): ICD-10-CM

## 2025-08-15 RX ORDER — PREDNISONE 5 MG/1
10 TABLET ORAL DAILY
Qty: 60 TABLET | Refills: 0 | Status: SHIPPED | OUTPATIENT
Start: 2025-08-15

## 2025-09-05 ENCOUNTER — APPOINTMENT (OUTPATIENT)
Dept: PULMONOLOGY | Facility: HOSPITAL | Age: 75
End: 2025-09-05
Payer: MEDICARE

## 2025-09-05 ENCOUNTER — OFFICE VISIT (OUTPATIENT)
Dept: ORTHOPEDIC SURGERY | Age: 75
End: 2025-09-05

## 2025-09-05 VITALS — HEIGHT: 57 IN | WEIGHT: 110 LBS | BODY MASS INDEX: 23.73 KG/M2

## 2025-09-05 DIAGNOSIS — Z96.611 STATUS POST REVERSE TOTAL REPLACEMENT OF RIGHT SHOULDER: Primary | ICD-10-CM

## 2025-09-05 PROCEDURE — 99024 POSTOP FOLLOW-UP VISIT: CPT

## 2025-11-24 ENCOUNTER — APPOINTMENT (OUTPATIENT)
Dept: PRIMARY CARE | Facility: CLINIC | Age: 75
End: 2025-11-24
Payer: MEDICARE

## (undated) DEVICE — GLOVE ORTHO 8   MSG9480

## (undated) DEVICE — SOLUTION WND IRRIGATION 450 ML 0.5 PVP-I 0.9 NACL

## (undated) DEVICE — WIPES SKIN CLOTH READYPREP 9 X 10.5 IN 2% CHLORHEX GLUCONATE CHG PREOP

## (undated) DEVICE — KIT CHAIR TRIMANO FOAM W/ SUPP ARM DRP ERGONOMICALLY DESIGNED

## (undated) DEVICE — ELECTRODE PT RET AD L9FT HI MOIST COND ADH HYDRGEL CORDED

## (undated) DEVICE — DRESSING HYDROFIBER AQUACEL AG ADVANTAGE 3.5X10 IN

## (undated) DEVICE — IMPLANTABLE DEVICE
Type: IMPLANTABLE DEVICE | Site: SHOULDER | Status: NON-FUNCTIONAL
Removed: 2025-06-18

## (undated) DEVICE — 4-PORT MANIFOLD: Brand: NEPTUNE 2

## (undated) DEVICE — ORTHOMAX TOTAL HIP PACK: Brand: MEDLINE INDUSTRIES, INC.

## (undated) DEVICE — PIN GLENOID DYNANITE VIP 2.8MM

## (undated) DEVICE — 450 ML BOTTLE OF 0.05% CHLORHEXIDINE GLUCONATE IN 99.95% STERILE WATER FOR IRRIGATION, USP AND APPLICATOR.: Brand: IRRISEPT ANTIMICROBIAL WOUND LAVAGE

## (undated) DEVICE — GLOVE ORANGE PI 8   MSG9080

## (undated) DEVICE — BASIC DOUBLE BASIN 2-LF: Brand: MEDLINE INDUSTRIES, INC.

## (undated) DEVICE — SOLUTION IRRIG 250ML 0.9% SOD CHL USP POUR PLAS BTL

## (undated) DEVICE — PIN FIX 3.5X230 MM GLEN SHLDR STRL INHANCE LTX
Type: IMPLANTABLE DEVICE | Site: SHOULDER | Status: NON-FUNCTIONAL
Removed: 2025-06-18